# Patient Record
Sex: FEMALE | Race: BLACK OR AFRICAN AMERICAN | Employment: FULL TIME | RURAL
[De-identification: names, ages, dates, MRNs, and addresses within clinical notes are randomized per-mention and may not be internally consistent; named-entity substitution may affect disease eponyms.]

---

## 2017-03-06 ENCOUNTER — OFFICE VISIT (OUTPATIENT)
Dept: FAMILY MEDICINE CLINIC | Age: 47
End: 2017-03-06

## 2017-03-06 VITALS
RESPIRATION RATE: 17 BRPM | SYSTOLIC BLOOD PRESSURE: 110 MMHG | WEIGHT: 149.6 LBS | DIASTOLIC BLOOD PRESSURE: 62 MMHG | BODY MASS INDEX: 25.28 KG/M2

## 2017-03-06 DIAGNOSIS — E11.9 TYPE 2 DIABETES MELLITUS WITHOUT COMPLICATION, WITHOUT LONG-TERM CURRENT USE OF INSULIN (HCC): Primary | ICD-10-CM

## 2017-03-06 DIAGNOSIS — E78.2 MIXED HYPERLIPIDEMIA: ICD-10-CM

## 2017-03-06 RX ORDER — BLOOD SUGAR DIAGNOSTIC
STRIP MISCELLANEOUS
Refills: 3 | COMMUNITY
Start: 2017-02-05 | End: 2017-12-04 | Stop reason: SDUPTHER

## 2017-03-06 NOTE — LETTER
NOTIFICATION RETURN TO WORK / SCHOOL 
 
3/6/2017 9:29 AM 
 
Ms. [address] [address] [address] [facility] [phone] To Whom It May Concern: 
 
Neil Moore is currently under the care of Renetta Houston. She will return to work/school on: 03/07/2017. If there are questions or concerns please have the patient contact our office. Sincerely, Ramsey Corley NP

## 2017-03-06 NOTE — MR AVS SNAPSHOT
Visit Information Date & Time Provider Department Dept. Phone Encounter #  
 3/6/2017  8:00 AM Clarita Alex NP [address] [id number] Follow-up Instructions Return in about 6 months (around 9/6/2017). Follow-up and Disposition History Your Appointments 9/5/2017  8:00 AM  
ESTABLISHED PATIENT with Clarita Alex NP  
Renetta Houston ([address]) Appt Note: 6 mo f/u  
 1000 [facility] [address] [facility],5[address] [address] [phone]  
  
   
 [address] [facility],[address] [address] Upcoming Health Maintenance Date Due  
 FOOT EXAM Q1 1/14/1982 DTaP/Tdap/Td series (1 - Tdap) 1/14/1993 EYE EXAM RETINAL OR DILATED Q1 10/27/2015 INFLUENZA AGE 9 TO ADULT 8/1/2016 HEMOGLOBIN A1C Q6M 3/12/2017 BREAST CANCER SCRN MAMMOGRAM 5/8/2017 MICROALBUMIN Q1 9/12/2017 LIPID PANEL Q1 9/12/2017 PAP AKA CERVICAL CYTOLOGY 4/13/2018 Allergies as of 3/6/2017  Review Complete On: 3/6/2017 By: Clarita Alex NP No Known Allergies Current Immunizations  Never Reviewed Name Date Influenza Vaccine 10/13/2015, 2/2/2015 Pneumococcal Polysaccharide (PPSV-23) 2/2/2015 Not reviewed this visit You Were Diagnosed With   
  
 Codes Comments Type 2 diabetes mellitus without complication, without long-term current use of insulin (HCC)    -  Primary ICD-10-CM: E11.9 ICD-9-CM: 250.00 Mixed hyperlipidemia     ICD-10-CM: E78.2 ICD-9-CM: 272.2 Vitals BP Resp Weight(growth percentile) BMI Smoking Status 110/62 (BP 1 Location: Right arm, BP Patient Position: Sitting) 17 149 lb 9.6 oz (67.9 kg) 25.28 kg/m2 Never Smoker Vitals History BMI and BSA Data Body Mass Index Body Surface Area  
 25.28 kg/m 2 1.76 m 2 Preferred Pharmacy Pharmacy Name Phone [id number], [address] [place] [phone] Your Updated Medication List  
  
   
This list is accurate as of: 3/6/17 11:43 AM.  Always use your most recent med list.  
  
  
  
  
 iron aspgly,vv-Y-X14-FA-Ca-suc 596-49-24-4 mg-mg-mcg-mg Cap cap Commonly known as:  FERREX [address] Take 1 Cap by mouth daily. metFORMIN 500 mg tablet Commonly known as:  GLUCOPHAGE  
TAKE 1 TABLET BY MOUTH 2 TIMES DAILY WITH MEALS FOR SUGAR  
  
 MIRENA 20 mcg/24 hr (5 years) IUD Generic drug:  levonorgestrel 1 Each by IntraUTERine route once. ONETOUCH VERIO strip Generic drug:  glucose blood VI test strips USE TO CHECK FASTING BLOOD SUGAR EVERY MORNING  
  
 OTHER One touch verio flex glucose machine We Performed the Following HEMOGLOBIN A1C WITH EAG [40410 CPT(R)]  DIABETES FOOT EXAM [HM7 Custom] LIPID PANEL [16578 CPT(R)] METABOLIC PANEL, COMPREHENSIVE [53211 CPT(R)] MI COLLECTION VENOUS BLOOD,VENIPUNCTURE O0332469 CPT(R)] Follow-up Instructions Return in about 6 months (around 9/6/2017). Introducing [facility]! Reji Josemayra introduces [employer] patient portal. Now you can access parts of your medical record, email your doctor's office, and request medication refills online. 1. In your internet browser, go to [email or web address] 2. Click on the First Time User? Click Here link in the Sign In box. You will see the New Member Sign Up page. 3. Enter your [employer] Access Code exactly as it appears below. You will not need to use this code after youve completed the sign-up process. If you do not sign up before the expiration date, you must request a new code. · [employer] Access Code: [email or web address] Expires: 6/4/2017  8:09 AM 
 
4. Enter the last four digits of your Social Security Number (xxxx) and Date of Birth (mm/dd/yyyy) as indicated and click Submit. You will be taken to the next sign-up page. 5. Create a [employer] ID. This will be your [employer] login ID and cannot be changed, so think of one that is secure and easy to remember. 6. Create a [employer] password. You can change your password at any time. 7. Enter your Password Reset Question and Answer. This can be used at a later time if you forget your password. 8. Enter your e-mail address. You will receive e-mail notification when new information is available in [address] 9. Click Sign Up. You can now view and download portions of your medical record. 10. Click the Download Summary menu link to download a portable copy of your medical information. If you have questions, please visit the Frequently Asked Questions section of the [employer] website. Remember, [employer] is NOT to be used for urgent needs. For medical emergencies, dial 911. Now available from your iPhone and Android! Please provide this summary of care documentation to your next provider. Your primary care clinician is listed as Zac Grijalva. If you have any questions after today's visit, please call [phone].

## 2017-03-06 NOTE — PROGRESS NOTES
3/6/2017    Chief Complaint   Patient presents with    Diabetes     Check up and wants bloodwork        HPI: Mohan Hernandez is a 41 y.o. female. [employer] employee in [place]. Was being followed by Frederic Lees. Known to this provider from a couple of years ago. Presents for routine f/u Diabetes. Metformin bid. No adverse effects. Last A1C 6.9%    Lipids are very good. No therapy. No Known Allergies    Current Outpatient Prescriptions   Medication Sig Dispense Refill    ONETOUCH VERIO strip USE TO CHECK FASTING BLOOD SUGAR EVERY MORNING  3    metFORMIN (GLUCOPHAGE) 500 mg tablet TAKE 1 TABLET BY MOUTH 2 TIMES DAILY WITH MEALS FOR SUGAR 60 Tab 7    levonorgestrel (MIRENA) 20 mcg/24 hr (5 years) IUD 1 Each by IntraUTERine route once.  iron aspgly,ht-A-E32-FA-Ca-suc (FERREX 150 FORTE PLUS) 203-14-33-1 mg-mg-mcg-mg cap cap Take 1 Cap by mouth daily. 30 Cap 5    OTHER One touch verio flex glucose machine         Past Medical History:   Diagnosis Date    Anemia     Diabetes ([facility] [address]        Lab Results   Component Value Date/Time    Hemoglobin A1c 6.9 09/12/2016 08:16 AM    Hemoglobin A1c 6.6 02/22/2016 12:49 PM    Hemoglobin A1c 6.8 10/12/2015 09:19 AM    Glucose 148 09/12/2016 08:16 AM    Glucose  09/12/2016 01:51 PM    Microalb/Creat ratio (ug/mg creat.) 2.6 09/12/2016 08:15 AM    LDL, calculated 94 09/12/2016 08:16 AM    Creatinine 0.71 09/12/2016 08:16 AM       ROS:  Constitutional: No fever, chills or weight loss  Respiratory: No cough, SOB   CV: No chest pain or Palpitations  GI: No nausea, vomiting or diarrhea. : No dysuria or hematuria. Neuro: No headaches, seizures, change in mental status. Physical Exam:   VS Visit Vitals    /62 (BP 1 Location: Right arm, BP Patient Position: Sitting)    Resp 17    Wt 149 lb 9.6 oz (67.9 kg)    BMI 25.28 kg/m2      General  Alert, oriented, NAD.     ENMT Mucous membranes moist.    Oropharynx: no erythema, no exudates, no lesions, normal tongue. NECK Thyroid: normal size, nontender. Trachea midline, neck symmetrical and without masses. Carotids 2+ with no bruits. No enlarged nodes. RESP Clear to auscultation and percussion. No rales, wheezes, rhonchi, or rubs. CV RRR, with no S3 or S4, no murmur, no rub. Aorta: no bruit. MSKEL Normal gait and station. Normal strength and tone, no atrophy. EXT No deformity. Extremities without edema. Diabetic foot exam :   Measurement  Response    Monofilament   R - normal sensation with micro filament  L - normal sensation with micro filament    Pulse DP  R - 2+ (normal)  L - 2+ (normal)    Vibration  R - normal  L - normal    Structural deformity  R - None  L - None    Skin Integrity / Deformity  R - normal  L - normal       SKIN Skin warm, normal turgor. NEURO Cranial nerves normal 2-12. PSYCH Judgment and insight good. Oriented to person, place, and time. Affect is alert and attentive. 1. Type 2 diabetes mellitus without complication, without long-term current use of insulin ([address]  Well controlled. Check labs. - HEMOGLOBIN A1C WITH EAG  - LIPID PANEL  - METABOLIC PANEL, COMPREHENSIVE  - FL COLLECTION VENOUS BLOOD,VENIPUNCTURE  -  DIABETES FOOT EXAM    2. Mixed hyperlipidemia  Check labs. - LIPID PANEL  - METABOLIC PANEL, COMPREHENSIVE  - FL COLLECTION VENOUS BLOOD,VENIPUNCTURE        Orders Placed This Encounter    HEMOGLOBIN A1C WITH EAG    LIPID PANEL    METABOLIC PANEL, COMPREHENSIVE     DIABETES FOOT EXAM    FL COLLECTION VENOUS BLOOD,VENIPUNCTURE    ONETOUCH VERIO strip     Sig: USE TO CHECK FASTING BLOOD SUGAR EVERY MORNING     Refill:  3       Follow-up Disposition:  Return in about 6 months (around 9/6/2017).         KITTY Coughlin

## 2017-03-07 LAB
ALBUMIN SERPL-MCNC: 4.5 G/DL (ref 3.5–5.5)
ALBUMIN/GLOB SERPL: 1.6 {RATIO} (ref 1.1–2.5)
ALP SERPL-CCNC: 71 IU/L (ref 39–117)
ALT SERPL-CCNC: 12 IU/L (ref 0–32)
AST SERPL-CCNC: 17 IU/L (ref 0–40)
BILIRUB SERPL-MCNC: 0.7 MG/DL (ref 0–1.2)
BUN SERPL-MCNC: 7 MG/DL (ref 6–24)
BUN/CREAT SERPL: 9 (ref 9–23)
CALCIUM SERPL-MCNC: 8.8 MG/DL (ref 8.7–10.2)
CHLORIDE SERPL-SCNC: 100 MMOL/L (ref 96–106)
CHOLEST SERPL-MCNC: 158 MG/DL (ref 100–199)
CO2 SERPL-SCNC: 25 MMOL/L (ref 18–29)
CREAT SERPL-MCNC: 0.74 MG/DL (ref 0.57–1)
EST. AVERAGE GLUCOSE BLD GHB EST-MCNC: 146 MG/DL
GLOBULIN SER CALC-MCNC: 2.9 G/DL (ref 1.5–4.5)
GLUCOSE SERPL-MCNC: 118 MG/DL (ref 65–99)
HBA1C MFR BLD: 6.7 % (ref 4.8–5.6)
HDLC SERPL-MCNC: 58 MG/DL
LDLC SERPL CALC-MCNC: 94 MG/DL (ref 0–99)
POTASSIUM SERPL-SCNC: 3.7 MMOL/L (ref 3.5–5.2)
PROT SERPL-MCNC: 7.4 G/DL (ref 6–8.5)
SODIUM SERPL-SCNC: 139 MMOL/L (ref 134–144)
TRIGL SERPL-MCNC: 31 MG/DL (ref 0–149)
VLDLC SERPL CALC-MCNC: 6 MG/DL (ref 5–40)

## 2017-05-03 DIAGNOSIS — D50.0 IRON DEFICIENCY ANEMIA DUE TO CHRONIC BLOOD LOSS: ICD-10-CM

## 2017-09-05 ENCOUNTER — OFFICE VISIT (OUTPATIENT)
Dept: FAMILY MEDICINE CLINIC | Age: 47
End: 2017-09-05

## 2017-09-05 VITALS
RESPIRATION RATE: 17 BRPM | HEIGHT: 65 IN | SYSTOLIC BLOOD PRESSURE: 135 MMHG | WEIGHT: 159.4 LBS | OXYGEN SATURATION: 100 % | TEMPERATURE: 96.9 F | BODY MASS INDEX: 26.56 KG/M2 | DIASTOLIC BLOOD PRESSURE: 85 MMHG | HEART RATE: 84 BPM

## 2017-09-05 DIAGNOSIS — E78.5 HYPERLIPIDEMIA, UNSPECIFIED HYPERLIPIDEMIA TYPE: ICD-10-CM

## 2017-09-05 DIAGNOSIS — H61.23 IMPACTED CERUMEN, BILATERAL: ICD-10-CM

## 2017-09-05 DIAGNOSIS — E11.9 TYPE 2 DIABETES MELLITUS WITHOUT COMPLICATION, WITHOUT LONG-TERM CURRENT USE OF INSULIN (HCC): Primary | ICD-10-CM

## 2017-09-05 DIAGNOSIS — Z23 ENCOUNTER FOR IMMUNIZATION: ICD-10-CM

## 2017-09-05 NOTE — LETTER
NOTIFICATION RETURN TO WORK / SCHOOL 
 
9/5/2017 9:02 AM 
 
Ms. [address] [address] [address] [facility] [address] [phone] To Whom It May Concern: 
 
Osito Mills is currently under the care of Renetta Houston. She will return to work/school on: 09/07/2017 If there are questions or concerns please have the patient contact our office. Sincerely, Nacho Resendez NP

## 2017-09-05 NOTE — PROGRESS NOTES
Chief Complaint   Patient presents with    Diabetes     6 month check up          HPI:       is a 41 y.o. female. [employer] employee in [place]. Presents for routine f/u     Diabetes: Metformin 500 mg bid. Misses doses occasionally at night. No adverse effects. Her BG has been running 120 when fasting and 170-180s after meals. Last A1C 6.7%     Lipids are very good. No therapy. Iron deficiency: Doing well on iron. Mammogram: 5/8/15. Normal.  Plan for mammogram in October. Eye exam: To be done today. Last pap 4/13/15 was normal.      New Issues:  She feels as though her ears are impacted. Tried to use a Q-tip to clean them. Plan to administer Flu shot today. No Known Allergies    Current Outpatient Prescriptions   Medication Sig    iron aspgly,kn-X-H48-FA-Ca-suc (FERREX 150 FORTE PLUS) 985-90-87-3 mg-mg-mcg-mg cap cap Take 1 Cap by mouth daily.  ONETOUCH VERIO strip USE TO CHECK FASTING BLOOD SUGAR EVERY MORNING    metFORMIN (GLUCOPHAGE) 500 mg tablet TAKE 1 TABLET BY MOUTH 2 TIMES DAILY WITH MEALS FOR SUGAR    levonorgestrel (MIRENA) 20 mcg/24 hr (5 years) IUD 1 Each by IntraUTERine route once.  OTHER One touch verio flex glucose machine     No current facility-administered medications for this visit. Past Medical History:   Diagnosis Date    Anemia     Diabetes ([address]        No past surgical history on file. Social History     Social History    Marital status: SINGLE     Spouse name: N/A    Number of children: N/A    Years of education: N/A     Social History Main Topics    Smoking status: Never Smoker    Smokeless tobacco: None    Alcohol use Yes    Drug use: No    Sexual activity: Not Asked     Other Topics Concern    None     Social History Narrative       Family History   Problem Relation Age of Onset    Diabetes Mother     Diabetes Father        Above history reviewed.       ROS:  Denies fever, chills, cough, chest pain, SOB,  nausea, vomiting, or diarrhea. Denies wt loss, wt gain, hemoptysis, hematochezia or melena. Physical Examination:    /85 (BP 1 Location: Right arm, BP Patient Position: Sitting)  Pulse 84  Temp 96.9 °F (36.1 °C) (Oral)   Resp 17  Ht 5' 4.5\" (1.638 m)  Wt 159 lb 6.4 oz (72.3 kg)  SpO2 100%  BMI 26.94 kg/m2    General: Alert and Ox3, Fluent speech  HEENT:  PERRLA, EOM intact, impacted cerumen bilaterally prior to irrigation, after irrigation: TMs normal, turbinates, pharynx normal.  No thyromegaly. No cervical adenopathy. Neck:  Supple, no adenopathy, JVD, mass or bruit  Chest:  Clear to Ausculation, without wheezes, rales, rubs or ronchi  Cardiac: RRR  Extremities:  No cyanosis, clubbing or edema  Neurologic:  Ambulatory without assist, CN 2-12 grossly intact. Moves all extremities. Skin: no rash  Lymphadenopathy: no cervical or supraclavicular nodes    Ceruminosis is noted. Wax is removed by syringing and manual debridement. Instructions for home care to prevent wax buildup are given. ASSESSMENT AND PLAN:     1. Type 2 diabetes mellitus without complication, without long-term current use of insulin (HCC)  Good control  Continue current regimen   - MICROALBUMIN, UR, RAND W/ MICROALBUMIN/CREA RATIO  - HEMOGLOBIN A1C WITH EAG    2. Hyperlipidemia, unspecified hyperlipidemia type  Good control  Continue current regimen   - LIPID PANEL  - CBC WITH AUTOMATED DIFF  - METABOLIC PANEL, COMPREHENSIVE    3. Impacted cerumen, bilateral  Ceruminosis is noted. Wax is removed by syringing and manual debridement. Instructions for home care to prevent wax buildup are given. - REMOVAL IMPACTED CERUMEN IRRIGATION/LVG UNILAT    4.  Encounter for immunization  Administering today  - [place] IMMUNIZ ADMIN,1 SINGLE/COMB VAC/TOXOID  - INFLUENZA VIRUS VACCINE QUADRIVALENT, PRESERVATIVE FREE SYRINGE (45393)     RTC in 6 months    Vera Carson NP

## 2017-09-05 NOTE — PATIENT INSTRUCTIONS

## 2017-09-05 NOTE — MR AVS SNAPSHOT
Visit Information Date & Time Provider Department Dept. Phone Encounter #  
 9/5/2017  8:00 AM Tracey Mustafa NP [facility] [address] [phone] [id number] Follow-up Instructions Return in about 6 months (around 3/5/2018). Upcoming Health Maintenance Date Due  
 EYE EXAM RETINAL OR DILATED Q1 10/27/2015 BREAST CANCER SCRN MAMMOGRAM 5/8/2017 INFLUENZA AGE 9 TO ADULT 8/1/2017 HEMOGLOBIN A1C Q6M 9/6/2017 MICROALBUMIN Q1 9/12/2017 FOOT EXAM Q1 3/6/2018 LIPID PANEL Q1 3/6/2018 PAP AKA CERVICAL CYTOLOGY 4/13/2018 DTaP/Tdap/Td series (2 - Td) 9/5/2027 Allergies as of 9/5/2017  Review Complete On: 9/5/2017 By: Tracey Mustafa NP No Known Allergies Current Immunizations  Reviewed on 9/5/2017 Name Date Influenza Vaccine 10/13/2015, 2/2/2015 Influenza Vaccine (Quad) PF 9/5/2017  8:51 AM  
 Pneumococcal Polysaccharide (PPSV-23) 2/2/2015 Reviewed by Eric Merino on 9/5/2017 at  8:22 AM  
You Were Diagnosed With   
  
 Codes Comments Type 2 diabetes mellitus without complication, without long-term current use of insulin (HCC)    -  Primary ICD-10-CM: E11.9 ICD-9-CM: 250.00 Hyperlipidemia, unspecified hyperlipidemia type     ICD-10-CM: E78.5 ICD-9-CM: 272.4 Impacted cerumen, bilateral     ICD-10-CM: H61.23 
ICD-9-CM: 380.4 Encounter for immunization     ICD-10-CM: W00 ICD-9-CM: V03.89 Vitals BP Pulse Temp Resp Height(growth percentile) Weight(growth percentile) 135/85 (BP 1 Location: Right arm, BP Patient Position: Sitting) 84 96.9 °F (36.1 °C) (Oral) 17 5' 4.5\" (1.638 m) 159 lb 6.4 oz (72.3 kg) SpO2 BMI Smoking Status 100% 26.94 kg/m2 Never Smoker BMI and BSA Data Body Mass Index Body Surface Area  
 26.94 kg/m 2 1.81 m 2 Preferred Pharmacy Pharmacy Name Phone [id number] [place] [address], [address] [phone] Your Updated Medication List  
  
   
This list is accurate as of: 9/5/17  8:58 AM.  Always use your most recent med list.  
  
  
  
  
 iron aspgly,[id number]-Ca-suc 296-11-99-6 mg-mg-mcg-mg Cap cap Commonly known as:  FERREX [address] Take 1 Cap by mouth daily. metFORMIN 500 mg tablet Commonly known as:  GLUCOPHAGE  
TAKE 1 TABLET BY MOUTH 2 TIMES DAILY WITH MEALS FOR SUGAR  
  
 MIRENA 20 mcg/24 hr (5 years) IUD Generic drug:  levonorgestrel 1 Each by IntraUTERine route once. ONETOUCH VERIO strip Generic drug:  glucose blood VI test strips USE TO CHECK FASTING BLOOD SUGAR EVERY MORNING  
  
 OTHER One touch verio flex glucose machine We Performed the Following CBC WITH AUTOMATED DIFF [52008 CPT(R)] HEMOGLOBIN A1C WITH EAG [03302 CPT(R)] INFLUENZA VIRUS VAC QUAD,SPLIT,PRESV FREE SYRINGE 3/> YRS IM [id number] CPT(R)] LIPID PANEL [32188 CPT(R)] METABOLIC PANEL, COMPREHENSIVE [37818 CPT(R)] MICROALBUMIN, UR, RAND W/ MICROALBUMIN/CREA RATIO [id number] CPT(R)] SC IMMUNIZ ADMIN,1 SINGLE/COMB VAC/TOXOID [id number] CPT(R)] REMOVAL IMPACTED CERUMEN IRRIGATION/LVG UNILAT [id number] CPT(R)] Follow-up Instructions Return in about 6 months (around 3/5/2018). To-Do List   
 10/09/2017 11:00 AM  
  Appointment with [address] [place] [address] at [facility] Mammography ([phone]) IMPORTANT - Bring all relevant prior images from any facility outside of [facility] with you on the day of your exam.  Radiologist cannot provide same day results without comparison images. EXAM INSTRUCTIONS -Do not wear deodorant, lotion, or powders to your appointment. GENERAL INSTRUCTIONS - Bring a list of all medications you are currently taking, including over the counter medications. - Only patients will be allowed in the exam room.   This includes children. - Children under the age of 14 may not be left unattended. - [facility] patients must have an armband and be accompanied by a caregiver or family member. - If you have a hand-written prescription for this exam, you must bring it with you on the day of your exam.  If you have questions or need to reschedule or cancel your appointment, call [phone]. Patient Instructions Learning About Diabetes Food Guidelines Your Care Instructions Meal planning is important to manage diabetes. It helps keep your blood sugar at a target level (which you set with your doctor). You don't have to eat special foods. You can eat what your family eats, including sweets once in a while. But you do have to pay attention to how often you eat and how much you eat of certain foods. You may want to work with a dietitian or a certified diabetes educator (CDE) to help you plan meals and snacks. A dietitian or CDE can also help you lose weight if that is one of your goals. What should you know about eating carbs? Managing the amount of carbohydrate (carbs) you eat is an important part of healthy meals when you have diabetes. Carbohydrate is found in many foods. · Learn which foods have carbs. And learn the amounts of carbs in different foods. ¨ Bread, cereal, pasta, and rice have about 15 grams of carbs in a serving. A serving is 1 slice of bread (1 ounce), ½ cup of cooked cereal, or 1/3 cup of cooked pasta or rice. ¨ Fruits have 15 grams of carbs in a serving. A serving is 1 small fresh fruit, such as an apple or orange; ½ of a banana; ½ cup of cooked or canned fruit; ½ cup of fruit juice; 1 cup of melon or raspberries; or 2 tablespoons of dried fruit. ¨ Milk and no-sugar-added yogurt have 15 grams of carbs in a serving. A serving is 1 cup of milk or 2/3 cup of no-sugar-added yogurt. ¨ Starchy vegetables have 15 grams of carbs in a serving.  A serving is ½ cup of mashed potatoes or sweet potato; 1 cup winter squash; ½ of a small baked potato; ½ cup of cooked beans; or ½ cup cooked corn or green peas. · Learn how much carbs to eat each day and at each meal. A dietitian or CDE can teach you how to keep track of the amount of carbs you eat. This is called carbohydrate counting. · If you are not sure how to count carbohydrate grams, use the Plate Method to plan meals. It is a good, quick way to make sure that you have a balanced meal. It also helps you spread carbs throughout the day. ¨ Divide your plate by types of foods. Put non-starchy vegetables on half the plate, meat or other protein food on one-quarter of the plate, and a grain or starchy vegetable in the final quarter of the plate. To this you can add a small piece of fruit and 1 cup of milk or yogurt, depending on how many carbs you are supposed to eat at a meal. 
· Try to eat about the same amount of carbs at each meal. Do not \"save up\" your daily allowance of carbs to eat at one meal. 
· Proteins have very little or no carbs per serving. Examples of proteins are beef, chicken, turkey, fish, eggs, tofu, cheese, cottage cheese, and peanut butter. A serving size of meat is 3 ounces, which is about the size of a deck of cards. Examples of meat substitute serving sizes (equal to 1 ounce of meat) are 1/4 cup of cottage cheese, 1 egg, 1 tablespoon of peanut butter, and ½ cup of tofu. How can you eat out and still eat healthy? · Learn to estimate the serving sizes of foods that have carbohydrate. If you measure food at home, it will be easier to estimate the amount in a serving of restaurant food. · If the meal you order has too much carbohydrate (such as potatoes, corn, or baked beans), ask to have a low-carbohydrate food instead. Ask for a salad or green vegetables. · If you use insulin, check your blood sugar before and after eating out to help you plan how much to eat in the future.  
· If you eat more carbohydrate at a meal than you had planned, take a walk or do other exercise. This will help lower your blood sugar. What else should you know? · Limit saturated fat, such as the fat from meat and dairy products. This is a healthy choice because people who have diabetes are at higher risk of heart disease. So choose lean cuts of meat and nonfat or low-fat dairy products. Use olive or canola oil instead of butter or shortening when cooking. · Don't skip meals. Your blood sugar may drop too low if you skip meals and take insulin or certain medicines for diabetes. · Check with your doctor before you drink alcohol. Alcohol can cause your blood sugar to drop too low. Alcohol can also cause a bad reaction if you take certain diabetes medicines. Follow-up care is a key part of your treatment and safety. Be sure to make and go to all appointments, and call your doctor if you are having problems. It's also a good idea to know your test results and keep a list of the medicines you take. Where can you learn more? Go to http://pino-kenya.info/. Enter Q997 in the search box to learn more about \"Learning About Diabetes Food Guidelines. \" Current as of: March 13, 2017 Content Version: 11.3 © 1969-0357 [employer]. Care instructions adapted under license by [employer] (which disclaims liability or warranty for this information). If you have questions about a medical condition or this instruction, always ask your healthcare professional. [address] any warranty or liability for your use of this information. Introducing [facility]! [facility] introduces HQ plus patient portal. Now you can access parts of your medical record, email your doctor's office, and request medication refills online. 1. In your internet browser, go to [email or web address] 2. Click on the First Time User? Click Here link in the Sign In box. You will see the New Member Sign Up page. 3. Enter your tagga Access Code exactly as it appears below. You will not need to use this code after youve completed the sign-up process. If you do not sign up before the expiration date, you must request a new code. · tagga Access Code: [email or web address] Expires: 11/20/2017  3:55 PM 
 
4. Enter the last four digits of your Social Security Number (xxxx) and Date of Birth (mm/dd/yyyy) as indicated and click Submit. You will be taken to the next sign-up page. 5. Create a tagga ID. This will be your tagga login ID and cannot be changed, so think of one that is secure and easy to remember. 6. Create a tagga password. You can change your password at any time. 7. Enter your Password Reset Question and Answer. This can be used at a later time if you forget your password. 8. Enter your e-mail address. You will receive e-mail notification when new information is available in [address]. 9. Click Sign Up. You can now view and download portions of your medical record. 10. Click the Download Summary menu link to download a portable copy of your medical information. If you have questions, please visit the Frequently Asked Questions section of the tagga website. Remember, tagga is NOT to be used for urgent needs. For medical emergencies, dial 911. Now available from your iPhone and Android! Please provide this summary of care documentation to your next provider. Your primary care clinician is listed as Rico Gonzales. If you have any questions after today's visit, please call [phone].

## 2017-09-06 LAB
ALBUMIN SERPL-MCNC: 4 G/DL (ref 3.5–5.5)
ALBUMIN/CREAT UR: 6.3 MG/G CREAT (ref 0–30)
ALBUMIN/GLOB SERPL: 1.5 {RATIO} (ref 1.2–2.2)
ALP SERPL-CCNC: 66 IU/L (ref 39–117)
ALT SERPL-CCNC: 14 IU/L (ref 0–32)
AST SERPL-CCNC: 21 IU/L (ref 0–40)
BASOPHILS # BLD AUTO: 0 X10E3/UL (ref 0–0.2)
BASOPHILS NFR BLD AUTO: 0 %
BILIRUB SERPL-MCNC: 0.5 MG/DL (ref 0–1.2)
BUN SERPL-MCNC: 9 MG/DL (ref 6–24)
BUN/CREAT SERPL: 13 (ref 9–23)
CALCIUM SERPL-MCNC: 8.8 MG/DL (ref 8.7–10.2)
CHLORIDE SERPL-SCNC: 100 MMOL/L (ref 96–106)
CHOLEST SERPL-MCNC: 153 MG/DL (ref 100–199)
CO2 SERPL-SCNC: 24 MMOL/L (ref 18–29)
CREAT SERPL-MCNC: 0.7 MG/DL (ref 0.57–1)
CREAT UR-MCNC: 291.1 MG/DL
EOSINOPHIL # BLD AUTO: 0.1 X10E3/UL (ref 0–0.4)
EOSINOPHIL NFR BLD AUTO: 2 %
ERYTHROCYTE [DISTWIDTH] IN BLOOD BY AUTOMATED COUNT: 13.4 % (ref 12.3–15.4)
EST. AVERAGE GLUCOSE BLD GHB EST-MCNC: 151 MG/DL
GLOBULIN SER CALC-MCNC: 2.7 G/DL (ref 1.5–4.5)
GLUCOSE SERPL-MCNC: 155 MG/DL (ref 65–99)
HBA1C MFR BLD: 6.9 % (ref 4.8–5.6)
HCT VFR BLD AUTO: 35.6 % (ref 34–46.6)
HDLC SERPL-MCNC: 72 MG/DL
HGB BLD-MCNC: 11.8 G/DL (ref 11.1–15.9)
IMM GRANULOCYTES # BLD: 0 X10E3/UL (ref 0–0.1)
IMM GRANULOCYTES NFR BLD: 0 %
LDLC SERPL CALC-MCNC: 73 MG/DL (ref 0–99)
LYMPHOCYTES # BLD AUTO: 1.3 X10E3/UL (ref 0.7–3.1)
LYMPHOCYTES NFR BLD AUTO: 33 %
MCH RBC QN AUTO: 27.9 PG (ref 26.6–33)
MCHC RBC AUTO-ENTMCNC: 33.1 G/DL (ref 31.5–35.7)
MCV RBC AUTO: 84 FL (ref 79–97)
MICROALBUMIN UR-MCNC: 18.4 UG/ML
MONOCYTES # BLD AUTO: 0.3 X10E3/UL (ref 0.1–0.9)
MONOCYTES NFR BLD AUTO: 7 %
NEUTROPHILS # BLD AUTO: 2.3 X10E3/UL (ref 1.4–7)
NEUTROPHILS NFR BLD AUTO: 58 %
PLATELET # BLD AUTO: 303 X10E3/UL (ref 150–379)
POTASSIUM SERPL-SCNC: 3.6 MMOL/L (ref 3.5–5.2)
PROT SERPL-MCNC: 6.7 G/DL (ref 6–8.5)
RBC # BLD AUTO: 4.23 X10E6/UL (ref 3.77–5.28)
SODIUM SERPL-SCNC: 140 MMOL/L (ref 134–144)
TRIGL SERPL-MCNC: 42 MG/DL (ref 0–149)
VLDLC SERPL CALC-MCNC: 8 MG/DL (ref 5–40)
WBC # BLD AUTO: 3.9 X10E3/UL (ref 3.4–10.8)

## 2017-09-07 NOTE — PROGRESS NOTES
Urine test is OK. Hemoglobin A1c is creeping up. Work on not missing your evening dose of Metformin. Cholesterol looks good. Blood count is normal.  Kidneys and liver look OK.

## 2017-09-18 RX ORDER — METFORMIN HYDROCHLORIDE 500 MG/1
TABLET ORAL
Qty: 60 TAB | Refills: 7 | Status: SHIPPED | OUTPATIENT
Start: 2017-09-18 | End: 2017-09-20 | Stop reason: SDUPTHER

## 2017-09-21 RX ORDER — METFORMIN HYDROCHLORIDE 500 MG/1
TABLET ORAL
Qty: 60 TAB | Refills: 7 | Status: SHIPPED | OUTPATIENT
Start: 2017-09-21 | End: 2018-03-02 | Stop reason: SDUPTHER

## 2017-12-04 ENCOUNTER — OFFICE VISIT (OUTPATIENT)
Dept: FAMILY MEDICINE CLINIC | Age: 47
End: 2017-12-04

## 2017-12-04 VITALS
HEART RATE: 91 BPM | HEIGHT: 65 IN | TEMPERATURE: 98.6 F | DIASTOLIC BLOOD PRESSURE: 70 MMHG | SYSTOLIC BLOOD PRESSURE: 110 MMHG | OXYGEN SATURATION: 97 % | WEIGHT: 157.8 LBS | BODY MASS INDEX: 26.29 KG/M2 | RESPIRATION RATE: 14 BRPM

## 2017-12-04 DIAGNOSIS — B37.31 VAGINA, CANDIDIASIS: Primary | ICD-10-CM

## 2017-12-04 DIAGNOSIS — E11.9 TYPE 2 DIABETES MELLITUS WITHOUT COMPLICATION, WITHOUT LONG-TERM CURRENT USE OF INSULIN (HCC): ICD-10-CM

## 2017-12-04 DIAGNOSIS — J06.9 UPPER RESPIRATORY TRACT INFECTION, UNSPECIFIED TYPE: ICD-10-CM

## 2017-12-04 DIAGNOSIS — N89.8 VAGINAL DISCHARGE: ICD-10-CM

## 2017-12-04 DIAGNOSIS — J04.0 LARYNGITIS: ICD-10-CM

## 2017-12-04 RX ORDER — METHYLPREDNISOLONE ACETATE 80 MG/ML
80 INJECTION, SUSPENSION INTRA-ARTICULAR; INTRALESIONAL; INTRAMUSCULAR; SOFT TISSUE ONCE
Qty: 1 VIAL | Refills: 0
Start: 2017-12-04 | End: 2017-12-04

## 2017-12-04 RX ORDER — BLOOD SUGAR DIAGNOSTIC
STRIP MISCELLANEOUS SEE ADMIN INSTRUCTIONS
Qty: 100 STRIP | Refills: 3 | Status: SHIPPED | OUTPATIENT
Start: 2017-12-04 | End: 2018-09-04 | Stop reason: SDUPTHER

## 2017-12-04 RX ORDER — FLUCONAZOLE 150 MG/1
150 TABLET ORAL DAILY
Qty: 1 TAB | Refills: 0 | Status: SHIPPED | OUTPATIENT
Start: 2017-12-04 | End: 2017-12-05

## 2017-12-04 RX ORDER — LANCETS 33 GAUGE
EACH MISCELLANEOUS
Qty: 100 LANCET | Refills: 3 | Status: SHIPPED | OUTPATIENT
Start: 2017-12-04 | End: 2018-09-04 | Stop reason: SDUPTHER

## 2017-12-04 NOTE — PROGRESS NOTES
Chief Complaint   Patient presents with    Hoarse         HPI:       is a 41 y.o. female. [employer] employee in [place]. Presents for routine f/u      Diabetes: Metformin 500 mg bid. Misses doses occasionally at night. No adverse effects. Her BG has been running 120 when fasting and 170-180s after meals. Last A1C 6.9%     Lipids are very good. No therapy. Iron deficiency: Doing well on iron. Mammogram: 10/2017. Normal.    Eye exam: 09/2017   Last pap 2017 was normal.      New Issues:  Has been sick for about a week. Taking Nyquil and Yana-[place] Plus Cold. Her voice has been hoarse. She is coughing up discolored phlegm. No fevers or body aches. She is also having some white vaginal discharge. Normal pap a few months ago. No new sexual partners. No odor or discomfort. Denies chance of pregnancy. No Known Allergies    Current Outpatient Prescriptions   Medication Sig    DM/P-EPHED/ACETAMINOPH/DOXYLAM (NYQUIL PO) Take  by mouth.  ASPIRIN/SOD BICARB/CITRIC ACID (YANA-SELTZER EXTRA STRENGTH PO) Take  by mouth.  ONETOUCH VERIO strip USE TO CHECK FASTING BLOOD SUGAR EVERY MORNING    levonorgestrel (MIRENA) 20 mcg/24 hr (5 years) IUD 1 Each by IntraUTERine route once.  OTHER One touch verio flex glucose machine    metFORMIN (GLUCOPHAGE) 500 mg tablet TAKE 1 TABLET BY MOUTH 2 TIMES DAILY WITH MEALS FOR SUGAR    iron aspgly,jw-M-I23-FA-Ca-suc (FERREX 150 FORTE PLUS) 986-28-77-7 mg-mg-mcg-mg cap cap Take 1 Cap by mouth daily. No current facility-administered medications for this visit. Past Medical History:   Diagnosis Date    Anemia     Diabetes ([address]        No past surgical history on file. Social History     Social History    Marital status: SINGLE     Spouse name: N/A    Number of children: N/A    Years of education: N/A     Social History Main Topics    Smoking status: Never Smoker    Smokeless tobacco: Not on file    Alcohol use Yes    Drug use:  No  Sexual activity: Not on file     Other Topics Concern    Not on file     Social History Narrative       Family History   Problem Relation Age of Onset    Diabetes Mother     Diabetes Father        Above history reviewed. ROS:  Denies fever, chills, cough, chest pain, SOB,  nausea, vomiting, or diarrhea. Denies wt loss, wt gain, hemoptysis, hematochezia or melena. Physical Examination:    /70 (BP 1 Location: Left arm, BP Patient Position: Sitting)  Pulse 91  Temp 98.6 °F (37 °C) (Oral)   Resp 14  Ht 5' 4.5\" (1.638 m)  Wt 157 lb 12.8 oz (71.6 kg)  LMP Comment: on mirena  SpO2 97%  BMI 26.67 kg/m2    General: Alert and Ox3, Fluent speech  HEENT:  PERRLA, EOM intact, TMs, turbinates, pharynx normal.  No thyromegaly. No                   cervical adenopathy. Neck:  Supple, no adenopathy, JVD, mass or bruit  Chest:  Clear to Ausculation, without wheezes, rales, rubs or ronchi  Cardiac: RRR  Abdomen:  +BS, soft, nontender without palpable HSM  Gynecologic: The external genitalia is without lesions. Introitus is normal, vaginal walls pink and moist without lesions or evidence of trauma. There is no cervical motion tenderness and the adnexa are without masses. White, thin discharge is noted. Extremities:  No cyanosis, clubbing or edema  Neurologic:  Ambulatory without assist, CN 2-12 grossly intact. Moves all extremities. Skin: no rash  Lymphadenopathy: no cervical or supraclavicular nodes    ASSESSMENT AND PLAN:     1. Vagina, candidiasis  Findings consistent with candida infection  - fluconazole (DIFLUCAN) 150 mg tablet; Take 1 Tab by mouth daily for 1 day. FDA advises cautious prescribing of oral fluconazole in pregnancy. Dispense: 1 Tab; Refill: 0    2. Vaginal discharge  Ruling out other causes  - NUSWAB VAGINITIS PLUS    3.  Type 2 diabetes mellitus without complication, without long-term current use of insulin (HCC)  Good control  Continue current regimen   - HEMOGLOBIN A1C WITH EAG  - METABOLIC PANEL, COMPREHENSIVE    4. Upper respiratory tract infection, unspecified type  Symptoms are viral. Discussed recommendation to avoid antibiotic. Reviewed self care measures:  Fluids  Nasal Saline  Humidification + menthol petroleum (Vicks.)  Postural drainage  NSAID of choice PRN  Avoid decongestants, too drying and difficult to clear respiratory secretions.   - METHYLPREDNISOLONE ACETATE INJECTION 80 MG  - IN THER/PROPH/DIAG INJECTION, SUBCUT/IM  - methylPREDNISolone acetate (DEPO-MEDROL) 80 mg/mL injection; 1 mL by IntraMUSCular route once for 1 dose. Dispense: 1 Vial; Refill: 0    5. Laryngitis  Hot tea and salt water gargles  - METHYLPREDNISOLONE ACETATE INJECTION 80 MG  - IN THER/PROPH/DIAG INJECTION, SUBCUT/IM  - methylPREDNISolone acetate (DEPO-MEDROL) 80 mg/mL injection; 1 mL by IntraMUSCular route once for 1 dose.   Dispense: 1 Vial; Refill: 0     RTC in 3 months    Qi Street, NP

## 2017-12-04 NOTE — PATIENT INSTRUCTIONS
Laryngitis: Care Instructions  Your Care Instructions    Laryngitis is an inflammation of the voice box (larynx) that causes your voice to become raspy or hoarse. It can be short-lived or long-lasting. Most of the time, laryngitis comes on quickly and lasts as long as 2 weeks. It is caused by overuse, irritation, or infection of the vocal cords inside the larynx. Some of the most common causes are a cold, the flu, or allergies. Loud talking, shouting, cheering, or singing also can cause laryngitis. Stomach acid that backs up into the throat also can make you lose your voice. Resting your voice and taking other steps at home can help you get your voice back. Follow-up care is a key part of your treatment and safety. Be sure to make and go to all appointments, and call your doctor if you are having problems. It's also a good idea to know your test results and keep a list of the medicines you take. How can you care for yourself at home? · Follow your doctor's directions for treating the condition that caused you to lose your voice. If your doctor prescribed antibiotics, take them as directed. Do not stop taking them just because you feel better. You need to take the full course of antibiotics. · Before you use cough and cold medicines, check the label. They may not be safe for young children or for people with certain health problems. · Try to keep stomach acid from backing up into your throat. Do not eat just before bedtime. Reduce the amount of coffee and alcohol you drink, and eat healthy foods. Taking over-the-counter acid reducers can help when these steps are not enough. In some cases, you may need prescription medicine. · Rest your voice. You do not have to stop speaking, but use your voice as little as possible. Speak softly but do not whisper; whispering can bother your larynx more than speaking softly. Avoid talking on the telephone or trying to speak loudly. · Try not to clear your throat.  This can cause more irritation of your larynx. Take an over-the-counter cough suppressant (if your doctor recommends it) if you have a dry cough that does not produce mucus. · Do not smoke or allow others to smoke around you. If you need help quitting, talk to your doctor about stop-smoking programs and medicines. These can increase your chances of quitting for good. · Use a humidifier or vaporizer to add moisture to your bedroom. Humidity helps to thin the mucus in the nasal membranes that causes stuffiness or postnasal drip. Follow the directions for cleaning the machine. · Drink plenty of fluids, enough so that your urine is light yellow or clear like water. If you have kidney, heart, or liver disease and have to limit fluids, talk with your doctor before you increase the amount of fluids you drink. · Use saline (saltwater) nasal washes to help keep your nasal passages open and wash out mucus and bacteria. You can buy saline nose drops at a grocery store or drugstore. Or, you can make your own at home by mixing ½ teaspoon salt, 1 cup water (at room temperature), and ½ teaspoon baking soda. If you make your own, fill a bulb syringe with the solution, insert the tip into your nostril, and squeeze gently. [place] your nose. When should you call for help? Call 911 anytime you think you may need emergency care. For example, call if:  ? · You have trouble breathing. ?Call your doctor now or seek immediate medical care if:  ? · You have new or worse pain. ? · You have trouble swallowing. ? Watch closely for changes in your health, and be sure to contact your doctor if:  ? · You do not get better as expected. Where can you learn more? Go to http://pino-kenya.info/. Enter D053 in the search box to learn more about \"Laryngitis: Care Instructions. \"  Current as of: May 12, 2017  Content Version: 11.4  © 9388-2038 Healthwise, [employer].  Care instructions adapted under license by [facility] (which disclaims liability or warranty for this information). If you have questions about a medical condition or this instruction, always ask your healthcare professional. [address] any warranty or liability for your use of this information. Vaginal Yeast Infection: Care Instructions  Your Care Instructions    A vaginal yeast infection is caused by too many yeast cells in the vagina. This is common in women of all ages. Itching, vaginal discharge and irritation, and other symptoms can bother you. But yeast infections don't often cause other health problems. Some medicines can increase your risk of getting a yeast infection. These include antibiotics, birth control pills, hormones, and steroids. You may also be more likely to get a yeast infection if you are pregnant, have diabetes, douche, or wear tight clothes. With treatment, most yeast infections get better in 2 to 3 days. Follow-up care is a key part of your treatment and safety. Be sure to make and go to all appointments, and call your doctor if you are having problems. It's also a good idea to know your test results and keep a list of the medicines you take. How can you care for yourself at home? · Take your medicines exactly as prescribed. Call your doctor if you think you are having a problem with your medicine. · Ask your doctor about over-the-counter (OTC) medicines for yeast infections. They may cost less than prescription medicines. If you use an OTC treatment, read and follow all instructions on the label. · Do not use tampons while using a vaginal cream or suppository. The tampons can absorb the medicine. Use pads instead. · Wear loose cotton clothing. Do not wear nylon or other fabric that holds body heat and moisture close to the skin. · Try sleeping without underwear. · Do not scratch. Relieve itching with a cold pack or a cool bath. · Do not wash your vaginal area more than once a day.  Use plain water or a mild, unscented soap. Air-dry the vaginal area. · Change out of wet swimsuits after swimming. · Do not have sex until you have finished your treatment. · Do not douche. When should you call for help? Call your doctor now or seek immediate medical care if:  ? · You have unexpected vaginal bleeding. ? · You have new or increased pain in your vagina or pelvis. ? Watch closely for changes in your health, and be sure to contact your doctor if:  ? · You have a fever. ? · You are not getting better after 2 days. ? · Your symptoms come back after you finish your medicines. Where can you learn more? Go to http://pino-kenya.info/. Enter F604 in the search box to learn more about \"Vaginal Yeast Infection: Care Instructions. \"  Current as of: October 13, 2016  Content Version: 11.4  © 7240-3914 [employer]. Care instructions adapted under license by [employer] (which disclaims liability or warranty for this information). If you have questions about a medical condition or this instruction, always ask your healthcare professional. [address] any warranty or liability for your use of this information. If you have any questions regarding [facility], you may call [facility] support at [phone].

## 2017-12-04 NOTE — MR AVS SNAPSHOT
Visit Information Date & Time Provider Department Dept. Phone Encounter #  
 12/4/2017  8:00 AM Sharon Frazier NP [facility] [address] [phone] [id number] Follow-up Instructions Return in about 3 months (around 3/4/2018). Upcoming Health Maintenance Date Due HEMOGLOBIN A1C Q6M 3/5/2018 FOOT EXAM Q1 3/6/2018 PAP AKA CERVICAL CYTOLOGY 4/13/2018 MICROALBUMIN Q1 9/5/2018 EYE EXAM RETINAL OR DILATED Q1 9/5/2018 LIPID PANEL Q1 9/5/2018 BREAST CANCER SCRN MAMMOGRAM 11/10/2019 DTaP/Tdap/Td series (2 - Td) 9/5/2027 Allergies as of 12/4/2017  Review Complete On: 12/4/2017 By: Manasa Barros RN No Known Allergies Current Immunizations  Reviewed on 9/5/2017 Name Date Influenza Vaccine 10/13/2015, 2/2/2015 Influenza Vaccine (Quad) PF 9/5/2017  8:51 AM  
 Pneumococcal Polysaccharide (PPSV-23) 2/2/2015 Not reviewed this visit You Were Diagnosed With   
  
 Codes Comments Vagina, candidiasis    -  Primary ICD-10-CM: B37.3 ICD-9-CM: 112.1 Vaginal discharge     ICD-10-CM: N89.8 ICD-9-CM: 623.5 Type 2 diabetes mellitus without complication, without long-term current use of insulin (HCC)     ICD-10-CM: E11.9 ICD-9-CM: 250.00 Upper respiratory tract infection, unspecified type     ICD-10-CM: J06.9 ICD-9-CM: 465.9 Laryngitis     ICD-10-CM: J04.0 ICD-9-CM: 464.00 Vitals BP Pulse Temp Resp Height(growth percentile) Weight(growth percentile) 110/70 (BP 1 Location: Left arm, BP Patient Position: Sitting) 91 98.6 °F (37 °C) (Oral) 14 5' 4.5\" (1.638 m) 157 lb 12.8 oz (71.6 kg) SpO2 BMI OB Status Smoking Status 97% 26.67 kg/m2 Chemically Induced Never Smoker BMI and BSA Data Body Mass Index Body Surface Area  
 26.67 kg/m 2 1.81 m 2 Preferred Pharmacy Pharmacy Name Phone [id number], [address] [phone] Your Updated Medication List  
  
   
This list is accurate as of: 12/4/17  8:52 AM.  Always use your most recent med list. YANA-SELTZER EXTRA STRENGTH PO Take  by mouth. fluconazole 150 mg tablet Commonly known as:  DIFLUCAN Take 1 Tab by mouth daily for 1 day. FDA advises cautious prescribing of oral fluconazole in pregnancy. iron aspgly,if-U-C44-FA-Ca-suc 015-78-17-6 mg-mg-mcg-mg Cap cap Commonly known as:  FERREX [address] Take 1 Cap by mouth daily. metFORMIN 500 mg tablet Commonly known as:  GLUCOPHAGE  
TAKE 1 TABLET BY MOUTH 2 TIMES DAILY WITH MEALS FOR SUGAR  
  
 methylPREDNISolone acetate 80 mg/mL injection Commonly known as:  DEPO-MEDROL 1 mL by IntraMUSCular route once for 1 dose. MIRENA 20 mcg/24 hr (5 years) Iud  
Generic drug:  levonorgestrel 1 Each by IntraUTERine route once. NYQUIL PO Take  by mouth. ONETOUCH VERIO strip Generic drug:  glucose blood VI test strips USE TO CHECK FASTING BLOOD SUGAR EVERY MORNING  
  
 OTHER One touch verio flex glucose machine Prescriptions Sent to Pharmacy Refills  
 fluconazole (DIFLUCAN) 150 mg tablet 0 Sig: Take 1 Tab by mouth daily for 1 day. FDA advises cautious prescribing of oral fluconazole in pregnancy. Class: Normal  
 Pharmacy: [id number] Misael Shearer, [address] Ph #: [phone] Route: Oral  
  
We Performed the Following HEMOGLOBIN A1C WITH EAG [31347 CPT(R)] METABOLIC PANEL, COMPREHENSIVE [58678 CPT(R)] METHYLPREDNISOLONE ACETATE INJECTION 80 MG [ [facility] [address] [id number] Custom] NM THER/PROPH/DIAG INJECTION, SUBCUT/IM [id number] CPT(R)] Follow-up Instructions Return in about 3 months (around 3/4/2018). Patient Instructions Laryngitis: Care Instructions Your Care Instructions Laryngitis is an inflammation of the voice box (larynx) that causes your voice to become raspy or hoarse. It can be short-lived or long-lasting. Most of the time, laryngitis comes on quickly and lasts as long as 2 weeks. It is caused by overuse, irritation, or infection of the vocal cords inside the larynx. Some of the most common causes are a cold, the flu, or allergies. Loud talking, shouting, cheering, or singing also can cause laryngitis. Stomach acid that backs up into the throat also can make you lose your voice. Resting your voice and taking other steps at home can help you get your voice back. Follow-up care is a key part of your treatment and safety. Be sure to make and go to all appointments, and call your doctor if you are having problems. It's also a good idea to know your test results and keep a list of the medicines you take. How can you care for yourself at home? · Follow your doctor's directions for treating the condition that caused you to lose your voice. If your doctor prescribed antibiotics, take them as directed. Do not stop taking them just because you feel better. You need to take the full course of antibiotics. · Before you use cough and cold medicines, check the label. They may not be safe for young children or for people with certain health problems. · Try to keep stomach acid from backing up into your throat. Do not eat just before bedtime. Reduce the amount of coffee and alcohol you drink, and eat healthy foods. Taking over-the-counter acid reducers can help when these steps are not enough. In some cases, you may need prescription medicine. · Rest your voice. You do not have to stop speaking, but use your voice as little as possible. Speak softly but do not whisper; whispering can bother your larynx more than speaking softly. Avoid talking on the telephone or trying to speak loudly. · Try not to clear your throat.  This can cause more irritation of your larynx. Take an over-the-counter cough suppressant (if your doctor recommends it) if you have a dry cough that does not produce mucus. · Do not smoke or allow others to smoke around you. If you need help quitting, talk to your doctor about stop-smoking programs and medicines. These can increase your chances of quitting for good. · Use a humidifier or vaporizer to add moisture to your bedroom. Humidity helps to thin the mucus in the nasal membranes that causes stuffiness or postnasal drip. Follow the directions for cleaning the machine. · Drink plenty of fluids, enough so that your urine is light yellow or clear like water. If you have kidney, heart, or liver disease and have to limit fluids, talk with your doctor before you increase the amount of fluids you drink. · Use saline (saltwater) nasal washes to help keep your nasal passages open and wash out mucus and bacteria. You can buy saline nose drops at a grocery store or drugstore. Or, you can make your own at home by mixing ½ teaspoon salt, 1 cup water (at room temperature), and ½ teaspoon baking soda. If you make your own, fill a bulb syringe with the solution, insert the tip into your nostril, and squeeze gently. Evone Bread your nose. When should you call for help? Call 911 anytime you think you may need emergency care. For example, call if: 
? · You have trouble breathing. ?Call your doctor now or seek immediate medical care if: 
? · You have new or worse pain. ? · You have trouble swallowing. ? Watch closely for changes in your health, and be sure to contact your doctor if: 
? · You do not get better as expected. Where can you learn more? Go to http://pino-kenya.info/. Enter R535 in the search box to learn more about \"Laryngitis: Care Instructions. \" Current as of: May 12, 2017 Content Version: 11.4 © 9164-3692 Healthwise, Incorporated.  Care instructions adapted under license by [address] (which disclaims liability or warranty for this information). If you have questions about a medical condition or this instruction, always ask your healthcare professional. [address] any warranty or liability for your use of this information. Vaginal Yeast Infection: Care Instructions Your Care Instructions A vaginal yeast infection is caused by too many yeast cells in the vagina. This is common in women of all ages. Itching, vaginal discharge and irritation, and other symptoms can bother you. But yeast infections don't often cause other health problems. Some medicines can increase your risk of getting a yeast infection. These include antibiotics, birth control pills, hormones, and steroids. You may also be more likely to get a yeast infection if you are pregnant, have diabetes, douche, or wear tight clothes. With treatment, most yeast infections get better in 2 to 3 days. Follow-up care is a key part of your treatment and safety. Be sure to make and go to all appointments, and call your doctor if you are having problems. It's also a good idea to know your test results and keep a list of the medicines you take. How can you care for yourself at home? · Take your medicines exactly as prescribed. Call your doctor if you think you are having a problem with your medicine. · Ask your doctor about over-the-counter (OTC) medicines for yeast infections. They may cost less than prescription medicines. If you use an OTC treatment, read and follow all instructions on the label. · Do not use tampons while using a vaginal cream or suppository. The tampons can absorb the medicine. Use pads instead. · Wear loose cotton clothing. Do not wear nylon or other fabric that holds body heat and moisture close to the skin. · Try sleeping without underwear. · Do not scratch. Relieve itching with a cold pack or a cool bath. · Do not wash your vaginal area more than once a day. Use plain water or a mild, unscented soap. Air-dry the vaginal area. · Change out of wet swimsuits after swimming. · Do not have sex until you have finished your treatment. · Do not douche. When should you call for help? Call your doctor now or seek immediate medical care if: 
? · You have unexpected vaginal bleeding. ? · You have new or increased pain in your vagina or pelvis. ? Watch closely for changes in your health, and be sure to contact your doctor if: 
? · You have a fever. ? · You are not getting better after 2 days. ? · Your symptoms come back after you finish your medicines. Where can you learn more? Go to http://pino-kenya.info/. Enter S421 in the search box to learn more about \"Vaginal Yeast Infection: Care Instructions. \" Current as of: October 13, 2016 Content Version: 11.4 © 5219-0887 [employer]. Care instructions adapted under license by [employer] (which disclaims liability or warranty for this information). If you have questions about a medical condition or this instruction, always ask your healthcare professional. [address] any warranty or liability for your use of this information. If you have any questions regarding [email or web address], you may call [email or web address].com support at [phone]. Introducing [facility]! [employer] introduces BetterWorks (Closed) patient portal. Now you can access parts of your medical record, email your doctor's office, and request medication refills online. 1. In your internet browser, go to [email or web address] 2. Click on the First Time User? Click Here link in the Sign In box. You will see the New Member Sign Up page. 3. Enter your BetterWorks (Closed) Access Code exactly as it appears below. You will not need to use this code after youve completed the sign-up process.  If you do not sign up before the expiration date, you must request a new code. · Kinesense Access Code: [email or web address] Expires: 3/4/2018  8:52 AM 
 
4. Enter the last four digits of your Social Security Number (xxxx) and Date of Birth (mm/dd/yyyy) as indicated and click Submit. You will be taken to the next sign-up page. 5. Create a Kinesense ID. This will be your Kinesense login ID and cannot be changed, so think of one that is secure and easy to remember. 6. Create a Kinesense password. You can change your password at any time. 7. Enter your Password Reset Question and Answer. This can be used at a later time if you forget your password. 8. Enter your e-mail address. You will receive e-mail notification when new information is available in [address] 9. Click Sign Up. You can now view and download portions of your medical record. 10. Click the Download Summary menu link to download a portable copy of your medical information. If you have questions, please visit the Frequently Asked Questions section of the Kinesense website. Remember, Kinesense is NOT to be used for urgent needs. For medical emergencies, dial 911. Now available from your iPhone and Android! Please provide this summary of care documentation to your next provider. Your primary care clinician is listed as Mel Tamez. If you have any questions after today's visit, please call [phone].

## 2017-12-04 NOTE — LETTER
NOTIFICATION RETURN TO WORK / SCHOOL 
 
12/4/2017 8:53 AM 
 
Ms. [address] [place] [phone] To Whom It May Concern: 
 
Ann-Marie Hawley is currently under the care of Renet[facility] [place]. She will return to work/school on: 12/06/2017. If there are questions or concerns please have the patient contact our office. Sincerely, Micheal Hernandez NP

## 2017-12-07 DIAGNOSIS — N76.0 BACTERIAL VAGINOSIS: ICD-10-CM

## 2017-12-07 DIAGNOSIS — A59.9 TRICHOMONAS INFECTION: Primary | ICD-10-CM

## 2017-12-07 DIAGNOSIS — B96.89 BACTERIAL VAGINOSIS: ICD-10-CM

## 2017-12-07 LAB
A VAGINAE DNA VAG QL NAA+PROBE: ABNORMAL SCORE
ALBUMIN SERPL-MCNC: 4.4 G/DL (ref 3.5–5.5)
ALBUMIN/GLOB SERPL: 1.6 {RATIO} (ref 1.2–2.2)
ALP SERPL-CCNC: 87 IU/L (ref 39–117)
ALT SERPL-CCNC: 11 IU/L (ref 0–32)
AST SERPL-CCNC: 13 IU/L (ref 0–40)
BILIRUB SERPL-MCNC: 0.4 MG/DL (ref 0–1.2)
BUN SERPL-MCNC: 8 MG/DL (ref 6–24)
BUN/CREAT SERPL: 9 (ref 9–23)
BVAB2 DNA VAG QL NAA+PROBE: ABNORMAL SCORE
C ALBICANS DNA VAG QL NAA+PROBE: NEGATIVE
C GLABRATA DNA VAG QL NAA+PROBE: NEGATIVE
C TRACH RRNA SPEC QL NAA+PROBE: NEGATIVE
CALCIUM SERPL-MCNC: 9 MG/DL (ref 8.7–10.2)
CHLORIDE SERPL-SCNC: 99 MMOL/L (ref 96–106)
CO2 SERPL-SCNC: 26 MMOL/L (ref 18–29)
CREAT SERPL-MCNC: 0.86 MG/DL (ref 0.57–1)
EST. AVERAGE GLUCOSE BLD GHB EST-MCNC: 160 MG/DL
GFR SERPLBLD CREATININE-BSD FMLA CKD-EPI: 82 ML/MIN/1.73
GFR SERPLBLD CREATININE-BSD FMLA CKD-EPI: 94 ML/MIN/1.73
GLOBULIN SER CALC-MCNC: 2.8 G/DL (ref 1.5–4.5)
GLUCOSE SERPL-MCNC: 148 MG/DL (ref 65–99)
HBA1C MFR BLD: 7.2 % (ref 4.8–5.6)
MEGA1 DNA VAG QL NAA+PROBE: ABNORMAL SCORE
N GONORRHOEA RRNA SPEC QL NAA+PROBE: NEGATIVE
POTASSIUM SERPL-SCNC: 3.8 MMOL/L (ref 3.5–5.2)
PROT SERPL-MCNC: 7.2 G/DL (ref 6–8.5)
SODIUM SERPL-SCNC: 140 MMOL/L (ref 134–144)
T VAGINALIS RRNA SPEC QL NAA+PROBE: POSITIVE

## 2017-12-07 RX ORDER — METRONIDAZOLE 500 MG/1
500 TABLET ORAL 3 TIMES DAILY
Qty: 21 TAB | Refills: 0 | Status: SHIPPED | OUTPATIENT
Start: 2017-12-07 | End: 2017-12-14

## 2017-12-07 NOTE — PROGRESS NOTES
Patient has been informed of these results. Please inform health department of the positive Trichomonas. Treating with Metronidazole 500 mg TID to cover Trichomonas and BV. She plans to tighten up on her evening dose of Metformin.

## 2018-01-31 ENCOUNTER — OFFICE VISIT (OUTPATIENT)
Dept: FAMILY MEDICINE CLINIC | Age: 48
End: 2018-01-31

## 2018-01-31 VITALS
WEIGHT: 147 LBS | HEIGHT: 64 IN | TEMPERATURE: 98.6 F | BODY MASS INDEX: 25.1 KG/M2 | OXYGEN SATURATION: 98 % | SYSTOLIC BLOOD PRESSURE: 110 MMHG | DIASTOLIC BLOOD PRESSURE: 72 MMHG | HEART RATE: 84 BPM

## 2018-01-31 DIAGNOSIS — R05.9 COUGH: ICD-10-CM

## 2018-01-31 DIAGNOSIS — R68.89 FLU-LIKE SYMPTOMS: Primary | ICD-10-CM

## 2018-01-31 DIAGNOSIS — R50.9 FEVER, UNSPECIFIED FEVER CAUSE: ICD-10-CM

## 2018-01-31 LAB
FLUAV+FLUBV AG NOSE QL IA.RAPID: NEGATIVE POS/NEG
FLUAV+FLUBV AG NOSE QL IA.RAPID: NEGATIVE POS/NEG
VALID INTERNAL CONTROL?: YES

## 2018-01-31 RX ORDER — PROMETHAZINE HYDROCHLORIDE AND CODEINE PHOSPHATE 6.25; 1 MG/5ML; MG/5ML
5 SOLUTION ORAL
Qty: 120 ML | Refills: 1 | Status: SHIPPED | OUTPATIENT
Start: 2018-01-31 | End: 2018-03-02

## 2018-01-31 NOTE — MR AVS SNAPSHOT
[facility] 
 
 
 [address] [address] [phone] Patient: Latrell Manley MRN: [id number] QIE:[id number] Visit Information Date & Time Provider Department Dept. Phone Encounter #  
 1/31/2018 11:10 AM Irasema Layne MD [address] [place] [address] [id number] Follow-up Instructions Return if symptoms worsen or fail to improve. Follow-up and Disposition History Your Appointments 3/2/2018  1:00 PM  
ESTABLISHED PATIENT with KEENA Watson (Lucio Gunn) Appt Note: 3 mo f/u  
 [address] [address] [phone]  
  
   
 [address] [place] [address],[address] [address] Upcoming Health Maintenance Date Due  
 PAP AKA CERVICAL CYTOLOGY 4/13/2018 FOOT EXAM Q1 3/6/2018 HEMOGLOBIN A1C Q6M 6/4/2018 MICROALBUMIN Q1 9/5/2018 EYE EXAM RETINAL OR DILATED Q1 9/5/2018 LIPID PANEL Q1 9/5/2018 BREAST CANCER SCRN MAMMOGRAM 11/10/2019 DTaP/Tdap/Td series (2 - Td) 9/5/2027 Allergies as of 1/31/2018  Review Complete On: 1/31/2018 By: Irasema Layne MD  
 No Known Allergies Current Immunizations  Reviewed on 9/5/2017 Name Date Influenza Vaccine 10/13/2015, 2/2/2015 Influenza Vaccine (Quad) PF 9/5/2017  8:51 AM  
 Pneumococcal Polysaccharide (PPSV-23) 2/2/2015 Not reviewed this visit You Were Diagnosed With   
  
 Codes Comments Flu-like symptoms    -  Primary ICD-10-CM: R68.89 ICD-9-CM: 780.99 Cough     ICD-10-CM: R05 ICD-9-CM: 786.2 Fever, unspecified fever cause     ICD-10-CM: R50.9 ICD-9-CM: 780.60 Vitals BP Pulse Temp Height(growth percentile) Weight(growth percentile) SpO2  
 110/72 84 98.6 °F (37 °C) (Oral) 5' 4\" (1.626 m) 147 lb (66.7 kg) 98% BMI OB Status Smoking Status 25.23 kg/m2 Chemically Induced Never Smoker BMI and BSA Data Body Mass Index Body Surface Area 25.23 kg/m 2 1.74 m 2 Preferred Pharmacy [facility] Phone [id number], [address] [place] [phone] Your Updated Medication List  
  
   
This list is accurate as of: 1/31/18  1:19 PM.  Always use your most recent med list. YANA-SELTZER EXTRA STRENGTH PO Take  by mouth.  
  
 iron aspgly,fc-J-B87-FA-Ca-suc 011-87-68-6 mg-mg-mcg-mg Cap cap Commonly known as:  FERREX [address] Take 1 Cap by mouth daily. lancets 33 gauge Misc E11.9, Use to check Glucose daily. Once touch Delica lancet  
  
 metFORMIN 500 mg tablet Commonly known as:  GLUCOPHAGE  
TAKE 1 TABLET BY MOUTH 2 TIMES DAILY WITH MEALS FOR SUGAR  
  
 MIRENA 20 mcg/24 hr (5 years) Iud  
Generic drug:  levonorgestrel 1 Each by IntraUTERine route once. ONETOUCH VERIO strip Generic drug:  glucose blood VI test strips  
by Does Not Apply route See Admin Instructions. DX E11.9 use to check glucose daily OTHER One touch verio flex glucose machine  
  
 promethazine-codeine 6.25-10 mg/5 mL syrup Commonly known as:  PHENERGAN with CODEINE Take 5 mL by mouth four (4) times daily as needed for Cough. Max Daily Amount: 20 mL. Prescriptions Printed Refills  
 promethazine-codeine (PHENERGAN WITH CODEINE) 6.25-10 mg/5 mL syrup 1 Sig: Take 5 mL by mouth four (4) times daily as needed for Cough. Max Daily Amount: 20 mL. Class: Print Route: Oral  
  
We Performed the Following AMB POC BRIAN INFLUENZA A/B TEST [92030 CPT(R)] Follow-up Instructions Return if symptoms worsen or fail to improve. To-Do List   
 01/31/2018 Lab:  CBC WITH AUTOMATED DIFF   
  
 01/31/2018 Imaging:  XR CHEST PA LAT Introducing [facility] & HE[facility] SERVICES! [facility] [employer] introduces [employer] patient portal. Now you can access parts of your medical record, email your doctor's office, and request medication refills online. 1. In your internet browser, go to [email or web address] 2. Click on the First Time User? Click Here link in the Sign In box. You will see the New Member Sign Up page. 3. Enter your Me-Mover Access Code exactly as it appears below. You will not need to use this code after youve completed the sign-up process. If you do not sign up before the expiration date, you must request a new code. · Me-Mover Access Code: [email or web address] Expires: 3/4/2018  8:52 AM 
 
4. Enter the last four digits of your Social Security Number (xxxx) and Date of Birth (mm/dd/yyyy) as indicated and click Submit. You will be taken to the next sign-up page. 5. Create a Me-Mover ID. This will be your Me-Mover login ID and cannot be changed, so think of one that is secure and easy to remember. 6. Create a Me-Mover password. You can change your password at any time. 7. Enter your Password Reset Question and Answer. This can be used at a later time if you forget your password. 8. Enter your e-mail address. You will receive e-mail notification when new information is available in [address] 9. Click Sign Up. You can now view and download portions of your medical record. 10. Click the Download Summary menu link to download a portable copy of your medical information. If you have questions, please visit the Frequently Asked Questions section of the Me-Mover website. Remember, Me-Mover is NOT to be used for urgent needs. For medical emergencies, dial 911. Now available from your iPhone and Android! Please provide this summary of care documentation to your next provider. Your primary care clinician is listed as Glenn Gerardo. If you have any questions after today's visit, please call [phone].

## 2018-01-31 NOTE — PROGRESS NOTES
Adia Barrios is a 57 y.o. female presenting for/with:    Cough (tried all over the counter without any help.) and URI    HPI:  Symptoms include productive cough, non-bloody, for past month or so, had about 2 weeks of improvement, then started to have more post-nasal drip and cough again. No dyspnea. No chills, last fever was 2 days ago to 101F, had some sweaty chills with that. Evaluation to date: none. Treatment to date: OTC products- nyquil, mucinex DM, APAP sinus helps a little, but not great. PMH, SH, Medications/Allergies: reviewed, on chart. Did get flu shot. ROS:  Constitutional: No fever, chills or abn weight loss- has been eating healthier  Respiratory: No cough, SOB   CV: No chest pain or Palpitations    Visit Vitals    /72    Pulse 84    Temp 98.6 °F (37 °C) (Oral)    Ht 5' 4\" (1.626 m)    Wt 147 lb (66.7 kg)    SpO2 98%    BMI 25.23 kg/m2     Wt Readings from Last 3 Encounters:   01/31/18 147 lb (66.7 kg)   12/04/17 157 lb 12.8 oz (71.6 kg)   09/05/17 159 lb 6.4 oz (72.3 kg)   -10#  BP Readings from Last 3 Encounters:   01/31/18 110/72   12/04/17 110/70   09/05/17 135/85     Physical Examination: General appearance - alert, well appearing, and in no distress  Mental status - alert, oriented to person, place, and time  Eyes - pupils equal and reactive, extraocular eye movements intact  ENT - bilateral external ears and nose normal. Normal lips  Neck - supple, no significant adenopathy, no thyromegaly or mass  Lymphatics - no palpable lymphadenopathy, no hepatosplenomegaly  Chest - clear to auscultation, no wheezes, rales or rhonchi, symmetric air entry  Heart - normal rate, regular rhythm, normal S1, S2, no murmurs, rubs, clicks or gallops  Extremities - peripheral pulses normal, no pedal edema, no clubbing or cyanosis    Rapid Flu: NEG    A/p:  Cough, chest congestion, fever a couple days ago, with reassuring exam today, but sx for past mo are concerning. Check CXR, CBC at [facility].  Con't OTC's for now.  Further tx per studies

## 2018-03-02 ENCOUNTER — OFFICE VISIT (OUTPATIENT)
Dept: FAMILY MEDICINE CLINIC | Age: 48
End: 2018-03-02

## 2018-03-02 VITALS
OXYGEN SATURATION: 99 % | RESPIRATION RATE: 18 BRPM | TEMPERATURE: 98 F | DIASTOLIC BLOOD PRESSURE: 62 MMHG | WEIGHT: 147 LBS | BODY MASS INDEX: 25.1 KG/M2 | HEIGHT: 64 IN | SYSTOLIC BLOOD PRESSURE: 108 MMHG | HEART RATE: 91 BPM

## 2018-03-02 DIAGNOSIS — E11.9 TYPE 2 DIABETES MELLITUS WITHOUT COMPLICATION, WITHOUT LONG-TERM CURRENT USE OF INSULIN (HCC): Primary | ICD-10-CM

## 2018-03-02 DIAGNOSIS — D50.0 IRON DEFICIENCY ANEMIA DUE TO CHRONIC BLOOD LOSS: ICD-10-CM

## 2018-03-02 DIAGNOSIS — E78.5 HYPERLIPIDEMIA, UNSPECIFIED HYPERLIPIDEMIA TYPE: ICD-10-CM

## 2018-03-02 RX ORDER — METFORMIN HYDROCHLORIDE 500 MG/1
1000 TABLET ORAL
Qty: 180 TAB | Refills: 3 | Status: SHIPPED | OUTPATIENT
Start: 2018-03-02 | End: 2019-05-14 | Stop reason: SDUPTHER

## 2018-03-02 NOTE — MR AVS SNAPSHOT
[address] [facility] [phone] Patient: Ting Martin MRN: [id number] ICP:1/36/1146 Visit Information Date & Time Provider Department Dept. Phone Encounter #  
 3/2/2018  1:00 PM Cherise Helm NP [facility] [address] [phone] [id number] Follow-up Instructions Return in about 3 months (around 6/2/2018). Follow-up and Disposition History Upcoming Health Maintenance Date Due  
 FOOT EXAM Q1 3/6/2018 PAP AKA CERVICAL CYTOLOGY 4/13/2018 HEMOGLOBIN A1C Q6M 6/4/2018 MICROALBUMIN Q1 9/5/2018 EYE EXAM RETINAL OR DILATED Q1 9/5/2018 LIPID PANEL Q1 9/5/2018 BREAST CANCER SCRN MAMMOGRAM 11/10/2019 DTaP/Tdap/Td series (2 - Td) 9/5/2027 Allergies as of 3/2/2018  Review Complete On: 3/2/2018 By: Cherise Helm NP No Known Allergies Current Immunizations  Reviewed on 9/5/2017 Name Date Influenza Vaccine 10/13/2015, 2/2/2015 Influenza Vaccine (Quad) PF 9/5/2017  8:51 AM  
 Pneumococcal Polysaccharide (PPSV-23) 2/2/2015 Not reviewed this visit You Were Diagnosed With   
  
 Codes Comments Type 2 diabetes mellitus without complication, without long-term current use of insulin (HCC)    -  Primary ICD-10-CM: E11.9 ICD-9-CM: 250.00 Hyperlipidemia, unspecified hyperlipidemia type     ICD-10-CM: E78.5 ICD-9-CM: 272.4 Iron deficiency anemia due to chronic blood loss     ICD-10-CM: D50.0 ICD-9-CM: 280.0 Vitals BP Pulse Temp Resp Height(growth percentile) Weight(growth percentile) 108/62 (BP 1 Location: Left arm, BP Patient Position: Sitting) 91 98 °F (36.7 °C) (Oral) 18 5' 4\" (1.626 m) 147 lb (66.7 kg) SpO2 BMI OB Status Smoking Status 99% 25.23 kg/m2 Chemically Induced Never Smoker Vitals History BMI and BSA Data Body Mass Index Body Surface Area  
 25.23 kg/m 2 1.74 m 2 Preferred Pharmacy Pharmacy Name Phone [facility]/PHARMACY #[id number] Smith [address] [phone] Your Updated Medication List  
  
   
This list is accurate as of 3/2/18  1:35 PM.  Always use your most recent med list.  
  
  
  
  
 iron aspgly,mk-I-U15-FA-Ca-suc 099-16-69-5 mg-mg-mcg-mg Cap cap Commonly known as:  FERREX [address] Take 1 Cap by mouth daily. lancets 33 gauge Misc E11.9, Use to check Glucose daily. Once touch Delica lancet  
  
 metFORMIN 500 mg tablet Commonly known as:  GLUCOPHAGE Take 2 Tabs by mouth daily (with breakfast). TAKE 1 TABLET BY MOUTH 2 TIMES DAILY WITH MEALS FOR SUGAR  
  
 MIRENA 20 mcg/24 hr (5 years) Iud  
Generic drug:  levonorgestrel 1 Each by IntraUTERine route once. ONETOUCH VERIO strip Generic drug:  glucose blood VI test strips  
by Does Not Apply route See Admin Instructions. DX E11.9 use to check glucose daily OTHER One touch verio flex glucose machine Prescriptions Sent to Pharmacy Refills  
 iron aspgly,uu-D-I00-FA-Ca-suc (FERREX 150 FORTE PLUS) 904-86-55-1 mg-mg-mcg-mg cap cap 3 Sig: Take 1 Cap by mouth daily. Class: Normal  
 Pharmacy: [facility]/pharmacy #[id number] Angelina Smith, [address] Ph #: [phone] Route: Oral  
 metFORMIN (GLUCOPHAGE) 500 mg tablet 3 Sig: Take 2 Tabs by mouth daily (with breakfast). TAKE 1 TABLET BY MOUTH 2 TIMES DAILY WITH MEALS FOR SUGAR Class: Normal  
 Pharmacy: [facility]/pharmacy #[id number] Angelina Garcialen, [address] Ph #: [phone] Route: Oral  
  
We Performed the Following CBC WITH AUTOMATED DIFF [80513 CPT(R)] COLLECTION VENOUS BLOOD,VENIPUNCTURE S4073060 CPT(R)] HEMOGLOBIN A1C WITH EAG [81902 CPT(R)]  DIABETES FOOT EXAM [7 Custom] LIPID PANEL [83504 CPT(R)] METABOLIC PANEL, COMPREHENSIVE [33506 CPT(R)] Follow-up Instructions Return in about 3 months (around 6/2/2018). Patient Instructions Diabetes Foot Health: Care Instructions Your Care Instructions When you have diabetes, your feet need extra care and attention. Diabetes can damage the nerve endings and blood vessels in your feet, making you less likely to notice when your feet are injured. Diabetes also limits your body's ability to fight infection and get blood to areas that need it. If you get a minor foot injury, it could become an ulcer or a serious infection. With good foot care, you can prevent most of these problems. Caring for your feet can be quick and easy. Most of the care can be done when you are bathing or getting ready for bed. Follow-up care is a key part of your treatment and safety. Be sure to make and go to all appointments, and call your doctor if you are having problems. It's also a good idea to know your test results and keep a list of the medicines you take. How can you care for yourself at home? · Keep your blood sugar close to normal by watching what and how much you eat, monitoring blood sugar, taking medicines if prescribed, and getting regular exercise. · Do not smoke. Smoking affects blood flow and can make foot problems worse. If you need help quitting, talk to your doctor about stop-smoking programs and medicines. These can increase your chances of quitting for good. · Eat a diet that is low in fats. High fat intake can cause fat to build up in your blood vessels and decrease blood flow. · Inspect your feet daily for blisters, cuts, cracks, or sores. If you cannot see well, use a mirror or have someone help you. · Take care of your feet: 
[facility] AUTHORITY your feet every day. Use warm (not hot) water. Check the water temperature with your wrists or other part of your body, not your feet. ¨ Dry your feet well. Pat them dry. Do not rub the skin on your feet too hard. Dry well between your toes.  If the skin on your feet stays moist, bacteria or a fungus can grow, which can lead to infection. ¨ Keep your skin soft. Use moisturizing skin cream to keep the skin on your feet soft and prevent calluses and cracks. But do not put the cream between your toes, and stop using any cream that causes a rash. ¨ Clean underneath your toenails carefully. Do not use a sharp object to clean underneath your toenails. Use the blunt end of a nail file or other rounded tool. ¨ Trim and file your toenails straight across to prevent ingrown toenails. Use a nail clipper, not scissors. Use an emery board to smooth the edges. · Change socks daily. Socks without seams are best, because seams often rub the feet. You can find socks for people with diabetes from specialty catalogs. · Look inside your shoes every day for things like gravel or torn linings, which could cause blisters or sores. · Buy shoes that fit well: 
¨ Look for shoes that have plenty of space around the toes. This helps prevent bunions and blisters. ¨ Try on shoes while wearing the kind of socks you will usually wear with the shoes. ¨ Avoid plastic shoes. They may rub your feet and cause blisters. Good shoes should be made of materials that are flexible and breathable, such as leather or cloth. ¨ Break in new shoes slowly by wearing them for no more than an hour a day for several days. Take extra time to check your feet for red areas, blisters, or other problems after you wear new shoes. · Do not go barefoot. Do not wear sandals, and do not wear shoes with very thin soles. Thin soles are easy to puncture. They also do not protect your feet from hot pavement or cold weather. · Have your doctor check your feet during each visit. If you have a foot problem, see your doctor. Do not try to treat an early foot problem at home. Home remedies or treatments that you can buy without a prescription (such as corn removers) can be harmful. · Always get early treatment for foot problems. A minor irritation can lead to a major problem if not properly cared for early. When should you call for help? Call your doctor now or seek immediate medical care if: 
? · You have a foot sore, an ulcer or break in the skin that is not healing after 4 days, bleeding corns or calluses, or an ingrown toenail. ? · You have blue or black areas, which can mean bruising or blood flow problems. ? · You have peeling skin or tiny blisters between your toes or cracking or oozing of the skin. ? · You have a fever for more than 24 hours and a foot sore. ? · You have new numbness or tingling in your feet that does not go away after you move your feet or change positions. ? · You have unexplained or unusual swelling of the foot or ankle. ? Watch closely for changes in your health, and be sure to contact your doctor if: 
? · You cannot do proper foot care. Where can you learn more? Go to http://pino-kenya.info/. Enter A739 in the search box to learn more about \"Diabetes Foot Health: Care Instructions. \" Current as of: March 13, 2017 Content Version: 11.4 © 7218-2958 [employer]. Care instructions adapted under license by [employer] (which disclaims liability or warranty for this information). If you have questions about a medical condition or this instruction, always ask your healthcare professional. [place] [address] any warranty or liability for your use of this information. Introducing \[facility] [place] [facility]\"" & HEALTH SERVICES! Jamie Jefferson introduces [employer] patient portal. Now you can access parts of your medical record, email your doctor's office, and request medication refills online. 1. In your internet browser, go to [email or web address] 2. Click on the First Time User? Click Here link in the Sign In box. You will see the New Member Sign Up page. 3. Enter your myDrugCosts Access Code exactly as it appears below. You will not need to use this code after youve completed the sign-up process. If you do not sign up before the expiration date, you must request a new code. · myDrugCosts Access Code: [email or web address] Expires: 3/4/2018  8:52 AM 
 
4. Enter the last four digits of your Social Security Number (xxxx) and Date of Birth (mm/dd/yyyy) as indicated and click Submit. You will be taken to the next sign-up page. 5. Create a myDrugCosts ID. This will be your myDrugCosts login ID and cannot be changed, so think of one that is secure and easy to remember. 6. Create a myDrugCosts password. You can change your password at any time. 7. Enter your Password Reset Question and Answer. This can be used at a later time if you forget your password. 8. Enter your e-mail address. You will receive e-mail notification when new information is available in [address]. 9. Click Sign Up. You can now view and download portions of your medical record. 10. Click the Download Summary menu link to download a portable copy of your medical information. If you have questions, please visit the Frequently Asked Questions section of the myDrug[employer]s website. Remember, myDrug[employer]s is NOT to be used for urgent needs. For medical emergencies, dial 911. Now available from your iPhone and Android! Please provide this summary of care documentation to your next provider. Your primary care clinician is listed as Kasia Lopez. If you have any questions after today's visit, please call [phone].

## 2018-03-02 NOTE — PROGRESS NOTES
Chief Complaint   Patient presents with    Diabetes     3 month follow up          HPI:       is a 57 y.o. female. [employer] employee in [place]. Presents for routine f/u      Diabetes: Metformin 500 mg bid. Misses doses occasionally at night. No adverse effects. Her BG has been running 120 when fasting and 170-180s after meals. Last A1C 6.9%     Lipids are very good. No therapy. Iron deficiency: Doing well on iron. Mammogram: 10/2017. Normal.    Eye exam: 09/2017   Last pap 2017 was normal.      New Issues:  Finally feeling better after having a cold. Is still not doing great with her nighttime dose of Metformin. Thinks it might be better if she takes both in the morning. No Known Allergies    Current Outpatient Prescriptions   Medication Sig    ONETOUCH VERIO strip by Does Not Apply route See Admin Instructions. DX E11.9 use to check glucose daily    lancets 33 gauge misc E11.9, Use to check Glucose daily. Once touch Delica lancet    metFORMIN (GLUCOPHAGE) 500 mg tablet TAKE 1 TABLET BY MOUTH 2 TIMES DAILY WITH MEALS FOR SUGAR    iron aspgly,ma-I-C59-FA-Ca-suc (FERREX 150 FORTE PLUS) 654-54-57-1 mg-mg-mcg-mg cap cap Take 1 Cap by mouth daily.  levonorgestrel (MIRENA) 20 mcg/24 hr (5 years) IUD 1 Each by IntraUTERine route once.  OTHER One touch verio flex glucose machine     No current facility-administered medications for this visit. Past Medical History:   Diagnosis Date    Anemia     Diabetes ([address]        No past surgical history on file.     Social History     Social History    Marital status: SINGLE     Spouse name: N/A    Number of children: N/A    Years of education: N/A     Social History Main Topics    Smoking status: Never Smoker    Smokeless tobacco: Never Used    Alcohol use Yes    Drug use: No    Sexual activity: Yes     Other Topics Concern    None     Social History Narrative       Family History   Problem Relation Age of Onset    Diabetes Mother     Diabetes Father        Above history reviewed. ROS:  Denies fever, chills, cough, chest pain, SOB,  nausea, vomiting, or diarrhea. Denies wt loss, wt gain, hemoptysis, hematochezia or melena. Physical Examination:    /62 (BP 1 Location: Left arm, BP Patient Position: Sitting)  Pulse 91  Temp 98 °F (36.7 °C) (Oral)   Resp 18  Ht 5' 4\" (1.626 m)  Wt 147 lb (66.7 kg)  SpO2 99%  BMI 25.23 kg/m2    General: Alert and Ox3, Fluent speech  HEENT:  PERRLA, EOM intact, TMs, turbinates, pharynx normal.  No thyromegaly. No cervical adenopathy. Neck:  Supple, no adenopathy, JVD, mass or bruit  Chest:  Clear to Ausculation, without wheezes, rales, rubs or ronchi  Cardiac: RRR  Extremities:  No cyanosis, clubbing or edema  Neurologic:  Ambulatory without assist, CN 2-12 grossly intact. Moves all extremities. Skin: no rash  Lymphadenopathy: no cervical or supraclavicular nodes  Diabetic Foot Exam:  Both feet are examined and demonstrate intact DP pulses. There is good capillary refill and sensation is intact. Skin is intact and there are no ulcers or sores. ASSESSMENT AND PLAN:     1. Type 2 diabetes mellitus without complication, without long-term current use of insulin (HCC)  Changing Metformin to 2 tabs in the morning since she keeps forgetting the evening dose. - metFORMIN (GLUCOPHAGE) 500 mg tablet; Take 2 Tabs by mouth daily (with breakfast). TAKE 1 TABLET BY MOUTH 2 TIMES DAILY WITH MEALS FOR SUGAR  Dispense: 180 Tab; Refill: 3  -  DIABETES FOOT EXAM  - METABOLIC PANEL, COMPREHENSIVE  - HEMOGLOBIN A1C WITH EAG  - COLLECTION VENOUS BLOOD,VENIPUNCTURE    2. Hyperlipidemia, unspecified hyperlipidemia type  Good control  Continue current regimen   - LIPID PANEL    3. Iron deficiency anemia due to chronic blood loss  Good control  Continue current regimen   - iron aspgly,dp-X-T03-FA-Ca-suc (FERREX 150 FORTE PLUS) 150-60-25-1 mg-mg-mcg-mg cap cap; Take 1 Cap by mouth daily.   Dispense: 90 Cap;  Refill: 3  - CBC WITH AUTOMATED DIFF     RTC in 3 months    Dianna Agee, NP

## 2018-03-02 NOTE — PATIENT INSTRUCTIONS
Diabetes Foot Health: Care Instructions  Your Care Instructions    When you have diabetes, your feet need extra care and attention. Diabetes can damage the nerve endings and blood vessels in your feet, making you less likely to notice when your feet are injured. Diabetes also limits your body's ability to fight infection and get blood to areas that need it. If you get a minor foot injury, it could become an ulcer or a serious infection. With good foot care, you can prevent most of these problems. Caring for your feet can be quick and easy. Most of the care can be done when you are bathing or getting ready for bed. Follow-up care is a key part of your treatment and safety. Be sure to make and go to all appointments, and call your doctor if you are having problems. It's also a good idea to know your test results and keep a list of the medicines you take. How can you care for yourself at home? · Keep your blood sugar close to normal by watching what and how much you eat, monitoring blood sugar, taking medicines if prescribed, and getting regular exercise. · Do not smoke. Smoking affects blood flow and can make foot problems worse. If you need help quitting, talk to your doctor about stop-smoking programs and medicines. These can increase your chances of quitting for good. · Eat a diet that is low in fats. High fat intake can cause fat to build up in your blood vessels and decrease blood flow. · Inspect your feet daily for blisters, cuts, cracks, or sores. If you cannot see well, use a mirror or have someone help you. · Take care of your feet:  [facility] AUTHORITY your feet every day. Use warm (not hot) water. Check the water temperature with your wrists or other part of your body, not your feet. ¨ Dry your feet well. Pat them dry. Do not rub the skin on your feet too hard. Dry well between your toes. If the skin on your feet stays moist, bacteria or a fungus can grow, which can lead to infection.   ¨ Keep your skin soft. Use moisturizing skin cream to keep the skin on your feet soft and prevent calluses and cracks. But do not put the cream between your toes, and stop using any cream that causes a rash. ¨ Clean underneath your toenails carefully. Do not use a sharp object to clean underneath your toenails. Use the blunt end of a nail file or other rounded tool. ¨ Trim and file your toenails straight across to prevent ingrown toenails. Use a nail clipper, not scissors. Use an emery board to smooth the edges. · Change socks daily. Socks without seams are best, because seams often rub the feet. You can find socks for people with diabetes from specialty catalogs. · Look inside your shoes every day for things like gravel or torn linings, which could cause blisters or sores. · Buy shoes that fit well:  ¨ Look for shoes that have plenty of space around the toes. This helps prevent bunions and blisters. ¨ Try on shoes while wearing the kind of socks you will usually wear with the shoes. ¨ Avoid plastic shoes. They may rub your feet and cause blisters. Good shoes should be made of materials that are flexible and breathable, such as leather or cloth. ¨ Break in new shoes slowly by wearing them for no more than an hour a day for several days. Take extra time to check your feet for red areas, blisters, or other problems after you wear new shoes. · Do not go barefoot. Do not wear sandals, and do not wear shoes with very thin soles. Thin soles are easy to puncture. They also do not protect your feet from hot pavement or cold weather. · Have your doctor check your feet during each visit. If you have a foot problem, see your doctor. Do not try to treat an early foot problem at home. Home remedies or treatments that you can buy without a prescription (such as corn removers) can be harmful. · Always get early treatment for foot problems. A minor irritation can lead to a major problem if not properly cared for early.   When should you call for help? Call your doctor now or seek immediate medical care if:  ? · You have a foot sore, an ulcer or break in the skin that is not healing after 4 days, bleeding corns or calluses, or an ingrown toenail. ? · You have blue or black areas, which can mean bruising or blood flow problems. ? · You have peeling skin or tiny blisters between your toes or cracking or oozing of the skin. ? · You have a fever for more than 24 hours and a foot sore. ? · You have new numbness or tingling in your feet that does not go away after you move your feet or change positions. ? · You have unexplained or unusual swelling of the foot or ankle. ? Watch closely for changes in your health, and be sure to contact your doctor if:  ? · You cannot do proper foot care. Where can you learn more? Go to http://pino-kenya.info/. Enter A739 in the search box to learn more about \"Diabetes Foot Health: Care Instructions. \"  Current as of: March 13, 2017  Content Version: 11.4  © 1473-8443 [employer]. Care instructions adapted under license by [employer] (which disclaims liability or warranty for this information). If you have questions about a medical condition or this instruction, always ask your healthcare professional. [address] any warranty or liability for your use of this information.

## 2018-03-03 LAB
BASOPHILS # BLD AUTO: 0 X10E3/UL (ref 0–0.2)
BASOPHILS NFR BLD AUTO: 0 %
EOSINOPHIL # BLD AUTO: 0.1 X10E3/UL (ref 0–0.4)
EOSINOPHIL NFR BLD AUTO: 1 %
ERYTHROCYTE [DISTWIDTH] IN BLOOD BY AUTOMATED COUNT: 13.4 % (ref 12.3–15.4)
HCT VFR BLD AUTO: 33.9 % (ref 34–46.6)
HGB BLD-MCNC: 11.6 G/DL (ref 11.1–15.9)
LYMPHOCYTES # BLD AUTO: 1.3 X10E3/UL (ref 0.7–3.1)
LYMPHOCYTES NFR BLD AUTO: 31 %
MCH RBC QN AUTO: 28.2 PG (ref 26.6–33)
MCHC RBC AUTO-ENTMCNC: 34.2 G/DL (ref 31.5–35.7)
MCV RBC AUTO: 83 FL (ref 79–97)
MONOCYTES # BLD AUTO: 0.3 X10E3/UL (ref 0.1–0.9)
MONOCYTES NFR BLD AUTO: 7 %
NEUTROPHILS # BLD AUTO: 2.5 X10E3/UL (ref 1.4–7)
NEUTROPHILS NFR BLD AUTO: 61 %
PLATELET # BLD AUTO: 321 X10E3/UL (ref 150–379)
RBC # BLD AUTO: 4.11 X10E6/UL (ref 3.77–5.28)
WBC # BLD AUTO: 4.2 X10E3/UL (ref 3.4–10.8)

## 2018-03-04 LAB
ALBUMIN SERPL-MCNC: 4.4 G/DL (ref 3.5–5.5)
ALBUMIN/GLOB SERPL: 1.9 {RATIO} (ref 1.2–2.2)
ALP SERPL-CCNC: 70 IU/L (ref 39–117)
ALT SERPL-CCNC: 9 IU/L (ref 0–32)
AST SERPL-CCNC: 11 IU/L (ref 0–40)
BILIRUB SERPL-MCNC: 0.8 MG/DL (ref 0–1.2)
BUN SERPL-MCNC: 10 MG/DL (ref 6–24)
BUN/CREAT SERPL: 10 (ref 9–23)
CALCIUM SERPL-MCNC: 9 MG/DL (ref 8.7–10.2)
CHLORIDE SERPL-SCNC: 100 MMOL/L (ref 96–106)
CHOLEST SERPL-MCNC: 158 MG/DL (ref 100–199)
CO2 SERPL-SCNC: 23 MMOL/L (ref 18–29)
CREAT SERPL-MCNC: 1.03 MG/DL (ref 0.57–1)
EST. AVERAGE GLUCOSE BLD GHB EST-MCNC: 140 MG/DL
GFR SERPLBLD CREATININE-BSD FMLA CKD-EPI: 65 ML/MIN/1.73
GFR SERPLBLD CREATININE-BSD FMLA CKD-EPI: 75 ML/MIN/1.73
GLOBULIN SER CALC-MCNC: 2.3 G/DL (ref 1.5–4.5)
GLUCOSE SERPL-MCNC: 157 MG/DL (ref 65–99)
HBA1C MFR BLD: 6.5 % (ref 4.8–5.6)
HDLC SERPL-MCNC: 58 MG/DL
LDLC SERPL CALC-MCNC: 92 MG/DL (ref 0–99)
POTASSIUM SERPL-SCNC: 3.5 MMOL/L (ref 3.5–5.2)
PROT SERPL-MCNC: 6.7 G/DL (ref 6–8.5)
SODIUM SERPL-SCNC: 141 MMOL/L (ref 134–144)
TRIGL SERPL-MCNC: 38 MG/DL (ref 0–149)
VLDLC SERPL CALC-MCNC: 8 MG/DL (ref 5–40)

## 2018-03-05 ENCOUNTER — TELEPHONE (OUTPATIENT)
Dept: FAMILY MEDICINE CLINIC | Age: 48
End: 2018-03-05

## 2018-03-05 NOTE — PROGRESS NOTES
Cholesterol is good. Liver and kidneys are good. A1c has improved to 6.5. Good job. Blood count looks good.

## 2018-03-06 ENCOUNTER — TELEPHONE (OUTPATIENT)
Dept: FAMILY MEDICINE CLINIC | Age: 48
End: 2018-03-06

## 2018-03-06 NOTE — TELEPHONE ENCOUNTER
----- Message from Aaliyah Quick NP sent at 3/5/2018  9:10 AM EST -----  Cholesterol is good. Liver and kidneys are good. A1c has improved to 6.5. Good job. Blood count looks good.

## 2018-06-01 ENCOUNTER — OFFICE VISIT (OUTPATIENT)
Dept: FAMILY MEDICINE CLINIC | Age: 48
End: 2018-06-01

## 2018-06-01 VITALS
WEIGHT: 153 LBS | BODY MASS INDEX: 26.12 KG/M2 | HEART RATE: 107 BPM | HEIGHT: 64 IN | OXYGEN SATURATION: 97 % | RESPIRATION RATE: 18 BRPM | DIASTOLIC BLOOD PRESSURE: 72 MMHG | SYSTOLIC BLOOD PRESSURE: 117 MMHG

## 2018-06-01 DIAGNOSIS — E11.9 TYPE 2 DIABETES MELLITUS WITHOUT COMPLICATION, WITHOUT LONG-TERM CURRENT USE OF INSULIN (HCC): Primary | ICD-10-CM

## 2018-06-01 NOTE — PATIENT INSTRUCTIONS

## 2018-06-01 NOTE — MR AVS SNAPSHOT
[place] 
 
 
 [address] [address] [phone] Patient: Fredo Talbot MRN: [id number] JTZ:[id number] Visit Information Date & Time Provider Department Dept. Phone Encounter #  
 6/1/2018  8:00 AM Minal Meadows NP [facility] [address] [phone] [id number] Follow-up Instructions Return in about 3 months (around 9/1/2018). Follow-up and Disposition History Upcoming Health Maintenance Date Due Influenza Age 5 to Adult 8/1/2018 HEMOGLOBIN A1C Q6M 9/2/2018 MICROALBUMIN Q1 9/5/2018 EYE EXAM RETINAL OR DILATED Q1 9/5/2018 FOOT EXAM Q1 3/2/2019 LIPID PANEL Q1 3/2/2019 BREAST CANCER SCRN MAMMOGRAM 11/10/2019 PAP AKA CERVICAL CYTOLOGY 7/29/2020 DTaP/Tdap/Td series (2 - Td) 9/5/2027 Allergies as of 6/1/2018  Review Complete On: 6/1/2018 By: Minal Meadows NP No Known Allergies Current Immunizations  Reviewed on 9/5/2017 Name Date Influenza Vaccine 10/13/2015, 2/2/2015 Influenza Vaccine (Quad) PF 9/5/2017  8:51 AM  
 Pneumococcal Polysaccharide (PPSV-23) 2/2/2015 Not reviewed this visit You Were Diagnosed With   
  
 Codes Comments Type 2 diabetes mellitus without complication, without long-term current use of insulin (HCC)    -  Primary ICD-10-CM: E11.9 ICD-9-CM: 250.00 Vitals BP Pulse Resp Height(growth percentile) Weight(growth percentile) SpO2  
 117/72 (BP 1 Location: Left arm, BP Patient Position: Sitting) (!) 107 18 5' 4\" (1.626 m) 153 lb (69.4 kg) 97% BMI OB Status Smoking Status 26.26 kg/m2 Chemically Induced Never Smoker Vitals History BMI and BSA Data Body Mass Index Body Surface Area  
 26.26 kg/m 2 1.77 m 2 Preferred Pharmacy Pharmacy Name Phone [id number] [address] [phone] Your Updated Medication List  
  
   
 This list is accurate as of 6/1/18  8:41 AM.  Always use your most recent med list.  
  
  
  
  
 iron aspgly,ed-I-N65-FA-Ca-suc 219-28-98-0 mg-mg-mcg-mg Cap cap Commonly known as:  FERREX [address] Take 1 Cap by mouth daily. lancets 33 gauge Misc E11.9, Use to check Glucose daily. Once touch Delica lancet  
  
 metFORMIN 500 mg tablet Commonly known as:  GLUCOPHAGE Take 2 Tabs by mouth daily (with breakfast). TAKE 1 TABLET BY MOUTH 2 TIMES DAILY WITH MEALS FOR SUGAR  
  
 MIRENA 20 mcg/24 hr (5 years) Iud  
Generic drug:  levonorgestrel 1 Each by IntraUTERine route once. ONETOUCH VERIO strip Generic drug:  glucose blood VI test strips  
by Does Not Apply route See Admin Instructions. DX E11.9 use to check glucose daily OTHER One touch verio flex glucose machine We Performed the Following HEMOGLOBIN A1C WITH EAG [49805 CPT(R)] METABOLIC PANEL, COMPREHENSIVE [00651 CPT(R)] Follow-up Instructions Return in about 3 months (around 9/1/2018). Patient Instructions Nutrition Tips for Diabetes: After Your Visit Your Care Instructions A healthy diet is important to manage diabetes. It helps you lose weight (if you need to) and keep it off. It gives you the nutrition and energy your body needs and helps prevent heart disease. But a diet for diabetes does not mean that you have to eat special foods. You can eat what your family eats, including occasional sweets and other favorites. But you do have to pay attention to how often you eat and how much you eat of certain foods. The right plan for you will give you meals that help you keep your blood sugar at healthy levels. Try to eat a variety of foods and to spread carbohydrate throughout the day. Carbohydrate raises blood sugar higher and more quickly than any other nutrient does.  Carbohydrate is found in sugar, breads and cereals, fruit, starchy vegetables such as potatoes and corn, and milk and yogurt. You may want to work with a dietitian or diabetes educator to help you plan meals and snacks. A dietitian or diabetes educator also can help you lose weight if that is one of your goals. The following tips can help you enjoy your meals and stay healthy. Follow-up care is a key part of your treatment and safety. Be sure to make and go to all appointments, and call your doctor if you are having problems. Its also a good idea to know your test results and keep a list of the medicines you take. How can you care for yourself at home? · Learn which foods have carbohydrate and how much carbohydrate to eat. A dietitian or diabetes educator can help you learn to keep track of how much carbohydrate you eat. · Spread carbohydrate throughout the day. Eat some carbohydrate at all meals, but do not eat too much at any one time. · Plan meals to include food from all the food groups. These are the food groups and some example portion sizes: ¨ Grains: 1 slice of bread (1 ounce), ½ cup of cooked cereal, and 1/3 cup of cooked pasta or rice. These have about 15 grams of carbohydrate in a serving. Choose whole grains such as whole wheat bread or crackers, oatmeal, and brown rice more often than refined grains. ¨ Fruit: 1 small fresh fruit, such as an apple or orange; ½ of a banana; ½ cup of chopped, cooked, or canned fruit; ½ cup of fruit juice; 1 cup of melon or raspberries; and 2 tablespoons of dried fruit. These have about 15 grams of carbohydrate in a serving. ¨ Dairy: 1 cup of nonfat or low-fat milk and 2/3 cup of plain yogurt. These have about 15 grams of carbohydrate in a serving. ¨ Protein foods: Beef, chicken, turkey, fish, eggs, tofu, cheese, cottage cheese, and peanut butter. A serving size of meat is 3 ounces, which is about the size of a deck of cards.  Examples of meat substitute serving sizes (equal to 1 ounce of meat) are 1/4 cup of cottage cheese, 1 egg, 1 tablespoon of peanut butter, and ½ cup of tofu. These have very little or no carbohydrate per serving. ¨ Vegetables: Starchy vegetables such as ½ cup of cooked dried beans, peas, potatoes, or corn have about 15 grams of carbohydrate. Nonstarchy vegetables have very little carbohydrate, such as 1 cup of raw leafy vegetables (such as spinach), ½ cup of other vegetables (cooked or chopped), and 3/4 cup of vegetable juice. · Use the plate format to plan meals. It is a good, quick way to make sure that you have a balanced meal. It also helps you spread carbohydrate throughout the day. You divide your plate by types of foods. Put vegetables on half the plate, meat or meat substitutes on one-quarter of the plate, and a grain or starchy vegetable (such as brown rice or a potato) in the final quarter of the plate. To this you can add a small piece of fruit and 1 cup of milk or yogurt, depending on how much carbohydrate you are supposed to eat at a meal. 
· Talk to your dietitian or diabetes educator about ways to add limited amounts of sweets into your meal plan. You can eat these foods now and then, as long as you include the amount of carbohydrate they have in your daily carbohydrate allowance. · If you drink alcohol, limit it to no more than 1 drink a day for women and 2 drinks a day for men. If you are pregnant, no amount of alcohol is known to be safe. · Protein, fat, and fiber do not raise blood sugar as much as carbohydrate does. If you eat a lot of these nutrients in a meal, your blood sugar will rise more slowly than it would otherwise. · Limit saturated fats, such as those from meat and dairy products. Try to replace it with monounsaturated fat, such as olive oil. This is a healthier choice because people who have diabetes are at higher-than-average risk of heart disease.  But use a modest amount of olive oil. A tablespoon of olive oil has 14 grams of fat and 120 calories. · Exercise lowers blood sugar. If you take insulin by shots or pump, you can use less than you would if you were not exercising. Keep in mind that timing matters. If you exercise within 1 hour after a meal, your body may need less insulin for that meal than it would if you exercised 3 hours after the meal. Test your blood sugar to find out how exercise affects your need for insulin. · Exercise on most days of the week. Aim for at least 30 minutes. Exercise helps you stay at a healthy weight and helps your body use insulin. Walking is an easy way to get exercise. Gradually increase the amount you walk every day. You also may want to swim, bike, or do other activities. When you eat out · Learn to estimate the serving sizes of foods that have carbohydrate. If you measure food at home, it will be easier to estimate the amount in a serving of restaurant food. · If the meal you order has too much carbohydrate (such as potatoes, corn, or baked beans), ask to have a low-carbohydrate food instead. Ask for a salad or green vegetables. · If you use insulin, check your blood sugar before and after eating out to help you plan how much to eat in the future. · If you eat more carbohydrate at a meal than you had planned, take a walk or do other exercise. This will help lower your blood sugar. Where can you learn more? Go to [employer] Enter Z130 in the search box to learn more about \"Nutrition Tips for Diabetes: After Your Visit. \"  
© 2926-7972 Healthwise, Incorporated. Care instructions adapted under license by Parisa Lara (which disclaims liability or warranty for this information).  This care instruction is for use with your licensed healthcare professional. If you have questions about a medical condition or this instruction, always ask your healthcare professional. [employer] disclaims any warranty or liability for your use of this information. Content Version: 02.2.125210; Current as of: June 4, 2014 If you have any questions regarding [employer], you may call [employer] support at [phone]. Patient Instructions History Introducing [facility]! [employer] introduces [employer] patient portal. Now you can access parts of your medical record, email your doctor's office, and request medication refills online. 1. In your internet browser, go to [email or web address] 2. Click on the First Time User? Click Here link in the Sign In box. You will see the New Member Sign Up page. 3. Enter your [employer] Access Code exactly as it appears below. You will not need to use this code after youve completed the sign-up process. If you do not sign up before the expiration date, you must request a new code. · [employer] Access Code: [email or web address] Expires: 6/17/2018 10:21 AM 
 
4. Enter the last four digits of your Social Security Number (xxxx) and Date of Birth (mm/dd/yyyy) as indicated and click Submit. You will be taken to the next sign-up page. 5. Create a [employer] ID. This will be your [employer] login ID and cannot be changed, so think of one that is secure and easy to remember. 6. Create a [employer] password. You can change your password at any time. 7. Enter your Password Reset Question and Answer. This can be used at a later time if you forget your password. 8. Enter your e-mail address. You will receive e-mail notification when new information is available in [address] 9. Click Sign Up. You can now view and download portions of your medical record. 10. Click the Download Summary menu link to download a portable copy of your medical information. If you have questions, please visit the Frequently Asked Questions section of the [employer] website.  Remember, [employer] is NOT to be used for urgent needs. For medical emergencies, dial 911. Now available from your iPhone and Android! Please provide this summary of care documentation to your next provider. Your primary care clinician is listed as Peter Mims. If you have any questions after today's visit, please call [phone].

## 2018-06-02 LAB
ALBUMIN SERPL-MCNC: 4.2 G/DL (ref 3.5–5.5)
ALBUMIN/GLOB SERPL: 1.6 {RATIO} (ref 1.2–2.2)
ALP SERPL-CCNC: 71 IU/L (ref 39–117)
ALT SERPL-CCNC: 17 IU/L (ref 0–32)
AST SERPL-CCNC: 18 IU/L (ref 0–40)
BILIRUB SERPL-MCNC: 0.4 MG/DL (ref 0–1.2)
BUN SERPL-MCNC: 12 MG/DL (ref 6–24)
BUN/CREAT SERPL: 16 (ref 9–23)
CALCIUM SERPL-MCNC: 9 MG/DL (ref 8.7–10.2)
CHLORIDE SERPL-SCNC: 100 MMOL/L (ref 96–106)
CO2 SERPL-SCNC: 24 MMOL/L (ref 18–29)
CREAT SERPL-MCNC: 0.76 MG/DL (ref 0.57–1)
EST. AVERAGE GLUCOSE BLD GHB EST-MCNC: 157 MG/DL
GFR SERPLBLD CREATININE-BSD FMLA CKD-EPI: 109 ML/MIN/1.73
GFR SERPLBLD CREATININE-BSD FMLA CKD-EPI: 94 ML/MIN/1.73
GLOBULIN SER CALC-MCNC: 2.7 G/DL (ref 1.5–4.5)
GLUCOSE SERPL-MCNC: 147 MG/DL (ref 65–99)
HBA1C MFR BLD: 7.1 % (ref 4.8–5.6)
POTASSIUM SERPL-SCNC: 4.4 MMOL/L (ref 3.5–5.2)
PROT SERPL-MCNC: 6.9 G/DL (ref 6–8.5)
SODIUM SERPL-SCNC: 138 MMOL/L (ref 134–144)

## 2018-06-04 ENCOUNTER — TELEPHONE (OUTPATIENT)
Dept: FAMILY MEDICINE CLINIC | Age: 48
End: 2018-06-04

## 2018-06-04 NOTE — TELEPHONE ENCOUNTER
----- Message from Pavel Padron NP sent at 6/4/2018  8:35 AM EDT -----  Kidneys look great! Liver looks good. A1c is climbing up. Stick with the 2 tablets of Metformin in the morning. Tighten up on carbs, starches and sugar.

## 2018-06-04 NOTE — PROGRESS NOTES
Kidneys look great! Liver looks good. A1c is climbing up. Stick with the 2 tablets of Metformin in the morning. Tighten up on carbs, starches and sugar.

## 2019-11-25 PROBLEM — F43.21 SITUATIONAL DEPRESSION: Status: ACTIVE | Noted: 2019-11-25

## 2020-06-22 ENCOUNTER — TELEPHONE (OUTPATIENT)
Dept: PEDIATRICS CLINIC | Age: 50
End: 2020-06-22

## 2020-06-22 DIAGNOSIS — R92.8 ABNORMAL MAMMOGRAM OF LEFT BREAST: Primary | ICD-10-CM

## 2020-06-24 ENCOUNTER — TELEPHONE (OUTPATIENT)
Dept: PEDIATRICS CLINIC | Age: 50
End: 2020-06-24

## 2021-04-26 ENCOUNTER — OFFICE VISIT (OUTPATIENT)
Dept: FAMILY MEDICINE CLINIC | Age: 51
End: 2021-04-26
Payer: COMMERCIAL

## 2021-04-26 ENCOUNTER — TELEPHONE (OUTPATIENT)
Dept: FAMILY MEDICINE CLINIC | Age: 51
End: 2021-04-26

## 2021-04-26 VITALS
WEIGHT: 165.2 LBS | BODY MASS INDEX: 28.2 KG/M2 | RESPIRATION RATE: 20 BRPM | OXYGEN SATURATION: 97 % | SYSTOLIC BLOOD PRESSURE: 120 MMHG | TEMPERATURE: 97.5 F | HEART RATE: 110 BPM | DIASTOLIC BLOOD PRESSURE: 70 MMHG | HEIGHT: 64 IN

## 2021-04-26 DIAGNOSIS — D50.8 OTHER IRON DEFICIENCY ANEMIA: ICD-10-CM

## 2021-04-26 DIAGNOSIS — E11.9 TYPE 2 DIABETES MELLITUS WITHOUT COMPLICATION, WITHOUT LONG-TERM CURRENT USE OF INSULIN (HCC): Primary | ICD-10-CM

## 2021-04-26 DIAGNOSIS — Z11.59 NEED FOR HEPATITIS C SCREENING TEST: ICD-10-CM

## 2021-04-26 LAB
ALBUMIN SERPL-MCNC: 4.1 G/DL (ref 3.5–5)
ALBUMIN/GLOB SERPL: 1.2 {RATIO} (ref 1.1–2.2)
ALP SERPL-CCNC: 96 U/L (ref 45–117)
ALT SERPL-CCNC: 16 U/L (ref 12–78)
ANION GAP SERPL CALC-SCNC: 9 MMOL/L (ref 5–15)
AST SERPL-CCNC: 14 U/L (ref 15–37)
BASOPHILS # BLD: 0 K/UL (ref 0–0.1)
BASOPHILS NFR BLD: 0 % (ref 0–1)
BILIRUB SERPL-MCNC: 0.5 MG/DL (ref 0.2–1)
BUN SERPL-MCNC: 11 MG/DL (ref 6–20)
BUN/CREAT SERPL: 13 (ref 12–20)
CALCIUM SERPL-MCNC: 8.9 MG/DL (ref 8.5–10.1)
CHLORIDE SERPL-SCNC: 105 MMOL/L (ref 97–108)
CHOLEST SERPL-MCNC: 171 MG/DL
CO2 SERPL-SCNC: 24 MMOL/L (ref 21–32)
CREAT SERPL-MCNC: 0.83 MG/DL (ref 0.55–1.02)
DIFFERENTIAL METHOD BLD: NORMAL
EOSINOPHIL # BLD: 0.1 K/UL (ref 0–0.4)
EOSINOPHIL NFR BLD: 1 % (ref 0–7)
ERYTHROCYTE [DISTWIDTH] IN BLOOD BY AUTOMATED COUNT: 13.1 % (ref 11.5–14.5)
EST. AVERAGE GLUCOSE BLD GHB EST-MCNC: 174 MG/DL
GLOBULIN SER CALC-MCNC: 3.3 G/DL (ref 2–4)
GLUCOSE SERPL-MCNC: 190 MG/DL (ref 65–100)
HBA1C MFR BLD: 7.7 % (ref 4–5.6)
HCT VFR BLD AUTO: 37.3 % (ref 35–47)
HCV AB SERPL QL IA: NONREACTIVE
HCV COMMENT,HCGAC: NORMAL
HDLC SERPL-MCNC: 56 MG/DL
HDLC SERPL: 3.1 {RATIO} (ref 0–5)
HGB BLD-MCNC: 12 G/DL (ref 11.5–16)
IMM GRANULOCYTES # BLD AUTO: 0 K/UL (ref 0–0.04)
IMM GRANULOCYTES NFR BLD AUTO: 0 % (ref 0–0.5)
LDLC SERPL CALC-MCNC: 107.8 MG/DL (ref 0–100)
LIPID PROFILE,FLP: ABNORMAL
LYMPHOCYTES # BLD: 1.5 K/UL (ref 0.8–3.5)
LYMPHOCYTES NFR BLD: 36 % (ref 12–49)
MCH RBC QN AUTO: 27.8 PG (ref 26–34)
MCHC RBC AUTO-ENTMCNC: 32.2 G/DL (ref 30–36.5)
MCV RBC AUTO: 86.3 FL (ref 80–99)
MONOCYTES # BLD: 0.2 K/UL (ref 0–1)
MONOCYTES NFR BLD: 5 % (ref 5–13)
NEUTS SEG # BLD: 2.3 K/UL (ref 1.8–8)
NEUTS SEG NFR BLD: 58 % (ref 32–75)
NRBC # BLD: 0 K/UL (ref 0–0.01)
NRBC BLD-RTO: 0 PER 100 WBC
PLATELET # BLD AUTO: 350 K/UL (ref 150–400)
PMV BLD AUTO: 10 FL (ref 8.9–12.9)
POTASSIUM SERPL-SCNC: 4.1 MMOL/L (ref 3.5–5.1)
PROT SERPL-MCNC: 7.4 G/DL (ref 6.4–8.2)
RBC # BLD AUTO: 4.32 M/UL (ref 3.8–5.2)
SODIUM SERPL-SCNC: 138 MMOL/L (ref 136–145)
TRIGL SERPL-MCNC: 36 MG/DL (ref ?–150)
VLDLC SERPL CALC-MCNC: 7.2 MG/DL
WBC # BLD AUTO: 4 K/UL (ref 3.6–11)

## 2021-04-26 PROCEDURE — 99213 OFFICE O/P EST LOW 20 MIN: CPT | Performed by: NURSE PRACTITIONER

## 2021-04-26 RX ORDER — INSULIN PUMP SYRINGE, 3 ML
EACH MISCELLANEOUS
Qty: 1 KIT | Refills: 0 | Status: SHIPPED | OUTPATIENT
Start: 2021-04-26

## 2021-04-26 NOTE — PROGRESS NOTES
Chief Complaint   Patient presents with    Diabetes     check up     3 most recent PHQ Screens 4/26/2021   PHQ Not Done -   Little interest or pleasure in doing things Not at all   Feeling down, depressed, irritable, or hopeless Not at all   Total Score PHQ 2 0   Trouble falling or staying asleep, or sleeping too much -   Feeling tired or having little energy -   Poor appetite, weight loss, or overeating -   Feeling bad about yourself - or that you are a failure or have let yourself or your family down -   Trouble concentrating on things such as school, work, reading, or watching TV -   Moving or speaking so slowly that other people could have noticed; or the opposite being so fidgety that others notice -   Thoughts of being better off dead, or hurting yourself in some way -   PHQ 9 Score -   How difficult have these problems made it for you to do your work, take care of your home and get along with others -     Learning Assessment 4/26/2021   PRIMARY LEARNER Patient   HIGHEST LEVEL OF EDUCATION - PRIMARY LEARNER  -   BARRIERS PRIMARY LEARNER -   CO-LEARNER CAREGIVER -   PRIMARY LANGUAGE ENGLISH   LEARNER PREFERENCE PRIMARY READING     LISTENING   ANSWERED BY patient   RELATIONSHIP SELF     Fall Risk Assessment, last 12 mths 4/26/2021   Able to walk? Yes   Fall in past 12 months? 0   Do you feel unsteady? 0   Are you worried about falling 0     Abuse Screening Questionnaire 4/26/2021   Do you ever feel afraid of your partner? N   Are you in a relationship with someone who physically or mentally threatens you? N   Is it safe for you to go home?  Y     ADL Assessment 4/26/2021   Feeding yourself No Help Needed   Getting from bed to chair No Help Needed   Getting dressed No Help Needed   Bathing or showering No Help Needed   Walk across the room (includes cane/walker) No Help Needed   Using the telphone No Help Needed   Taking your medications No Help Needed   Preparing meals No Help Needed   Managing money (expenses/bills) No Help Needed   Moderately strenuous housework (laundry) No Help Needed   Shopping for personal items (toiletries/medicines) No Help Needed   Shopping for groceries No Help Needed   Driving No Help Needed   Climbing a flight of stairs No Help Needed   Getting to places beyond walking distances No Help Needed     1. Have you been to the ER, urgent care clinic since your last visit? Hospitalized since your last visit? No    2. Have you seen or consulted any other health care providers outside of the [address] [place] [address] [place] since your last visit? Include any pap smears or colon screening.  No      Chief Complaint   Patient presents with    Diabetes     check up         Visit Vitals  /70 (BP 1 Location: Left arm, BP Patient Position: Sitting, BP Cuff Size: Adult long)   Pulse (!) 110   Temp 97.5 °F (36.4 °C) (Temporal)   Resp 20   Ht 5' 4\" (1.626 m)   Wt 165 lb 3.2 oz (74.9 kg)   SpO2 97%   BMI 28.36 kg/m²       Pain Scale: 0 - No pain/10  Pain Location:     Urbano Sanabria is a 54 y.o. female presenting for/with:    Diabetes (check up)      Symptom review:    NO  Fever   NO  Shaking chills  NO  Cough  NO  Body aches  NO  Coughing up blood  NO  Chest congestion  NO  Chest pain  NO  Shortness of breath  NO  Profound Loss of smell/taste  NO  Nausea/Vomiting   NO  Loose stool/Diarrhea  NO  any skin issues    Patient Risk Factors Reviewed as follows:  NO  have you been in Close contact with confirmed COVID19 patient   NO  History of recent travel to affected geographical areas within the past 14 days  NO  COPD  NO  Active Cancer/Leukemia/Lymphoma/Chemotherapy  NO  Oral steroid use  NO  Pregnant  NO  Diabetes Mellitus  NO  Heart disease  NO  Asthma  NO Health care worker at home  NO Health care worker  NO Is there a Pregnant Woman in the home  NO Dialysis pt in the home   NO a large number of people living in the home

## 2021-04-26 NOTE — TELEPHONE ENCOUNTER
----- Message from Estefany Montilla sent at 4/26/2021 12:56 PM EDT -----  Regarding: KEENA Dela Cruz/Telephone  General Message/Vendor Calls    Caller's first and last name: N/A      Reason for call: Pt would like to provide her vaccination dates      Callback required yes/no and why: yes, to provide her vaccination dates      Best contact number(s): [phone]      Details to clarify the request: Pt would like to provide her vaccination dates to Verna Benavidez

## 2021-04-26 NOTE — PROGRESS NOTES
Chief Complaint   Patient presents with    Diabetes     check up         HPI:       is a 54 y.o. female. [employer] employee in [place].      Diabetes: Metformin 500 mg (switched to 2 tabs in am s/t forgetting her nighttime doses).  No adverse effects.  Her BG has been running around 140, 170 at the highest.  Last A1C 7.5%     Iron deficiency: Doing well on iron.  Dose is spaced to 3 days per week.      New Issues:  She has been working evening shift. No Known Allergies    Current Outpatient Medications   Medication Sig    metFORMIN (GLUCOPHAGE) 1,000 mg tablet TAKE 1 TABLET BY MOUTH EVERY DAY WITH BREAKFAST    iron aspgly,qf-P-D73-FA-Ca-suc (FERREX 150 FORTE PLUS) 892-06-93-2 mg-mg-mcg-mg cap cap Take 1 Cap by mouth daily.  levonorgestrel (MIRENA) 20 mcg/24 hr (5 years) IUD 1 Each by IntraUTERine route once. No current facility-administered medications for this visit. Past Medical History:   Diagnosis Date    Anemia     Diabetes ([facility] [address]        No past surgical history on file. Social History     Socioeconomic History    Marital status: SINGLE     Spouse name: Not on file    Number of children: Not on file    Years of education: Not on file    Highest education level: Not on file   Tobacco Use    Smoking status: Never Smoker    Smokeless tobacco: Never Used   Substance and Sexual Activity    Alcohol use: Yes     Frequency: Monthly or less     Drinks per session: 1 or 2     Binge frequency: Never    Drug use: No    Sexual activity: Yes       Family History   Problem Relation Age of Onset    Diabetes Mother     Diabetes Father        Above history reviewed. ROS:  Denies fever, chills, cough, chest pain, SOB,  nausea, vomiting, or diarrhea. Denies wt loss, wt gain, hemoptysis, hematochezia or melena.     Physical Examination:    /70 (BP 1 Location: Left arm, BP Patient Position: Sitting, BP Cuff Size: Adult long)   Pulse (!) 110   Temp 97.5 °F (36.4 °C) (Temporal) Resp 20   Ht 5' 4\" (1.626 m)   Wt 165 lb 3.2 oz (74.9 kg)   SpO2 97%   BMI 28.36 kg/m²     General: Alert and Ox3, Fluent speech  HEENT:  PERRLA, EOM intact, TMs, turbinates, pharynx normal.  No thyromegaly. No cervical adenopathy. Neck:  Supple, no adenopathy, JVD, mass or bruit  Chest:  Clear to Ausculation, without wheezes, rales, rubs or ronchi  Cardiac: RRR  Extremities:  No cyanosis, clubbing or edema  Neurologic:  Ambulatory without assist, CN 2-12 grossly intact. Moves all extremities. Skin: no rash  Lymphadenopathy: no cervical or supraclavicular nodes    ASSESSMENT AND PLAN:     1. Type 2 diabetes mellitus without complication, without long-term current use of insulin ([facility] [address]  Checking labs  She is down 2 lbs   - HEMOGLOBIN A1C WITH EAG; Future  - METABOLIC PANEL, COMPREHENSIVE; Future  - LIPID PANEL; Future    2. Other iron deficiency anemia  Checking labs   - CBC WITH AUTOMATED DIFF; Future    3. Need for hepatitis C screening test  - HEPATITIS C AB;  Future     RTC in 3 months    Ariel Fonseca NP

## 2021-04-27 DIAGNOSIS — E11.9 TYPE 2 DIABETES MELLITUS WITHOUT COMPLICATION, WITHOUT LONG-TERM CURRENT USE OF INSULIN (HCC): Primary | ICD-10-CM

## 2021-04-27 NOTE — PROGRESS NOTES
Blood count normal.  cholesterol decent. Liver and kidneys are good. A1c up to 7.7%. Adding Hina Shutter. May help with weight some too.

## 2021-08-09 ENCOUNTER — OFFICE VISIT (OUTPATIENT)
Dept: FAMILY MEDICINE CLINIC | Age: 51
End: 2021-08-09
Payer: COMMERCIAL

## 2021-08-09 VITALS
SYSTOLIC BLOOD PRESSURE: 115 MMHG | WEIGHT: 165.8 LBS | TEMPERATURE: 98.7 F | HEART RATE: 104 BPM | DIASTOLIC BLOOD PRESSURE: 72 MMHG | HEIGHT: 64 IN | OXYGEN SATURATION: 96 % | RESPIRATION RATE: 17 BRPM | BODY MASS INDEX: 28.31 KG/M2

## 2021-08-09 DIAGNOSIS — H61.21 IMPACTED CERUMEN, RIGHT EAR: ICD-10-CM

## 2021-08-09 DIAGNOSIS — E11.9 TYPE 2 DIABETES MELLITUS WITHOUT COMPLICATION, WITHOUT LONG-TERM CURRENT USE OF INSULIN (HCC): Primary | ICD-10-CM

## 2021-08-09 DIAGNOSIS — E78.5 HYPERLIPIDEMIA, UNSPECIFIED HYPERLIPIDEMIA TYPE: ICD-10-CM

## 2021-08-09 DIAGNOSIS — Z12.11 SCREENING FOR MALIGNANT NEOPLASM OF COLON: ICD-10-CM

## 2021-08-09 PROCEDURE — 69209 REMOVE IMPACTED EAR WAX UNI: CPT | Performed by: NURSE PRACTITIONER

## 2021-08-09 PROCEDURE — 3051F HG A1C>EQUAL 7.0%<8.0%: CPT | Performed by: NURSE PRACTITIONER

## 2021-08-09 PROCEDURE — 99214 OFFICE O/P EST MOD 30 MIN: CPT | Performed by: NURSE PRACTITIONER

## 2021-08-09 NOTE — PROGRESS NOTES
Chief Complaint   Patient presents with    Diabetes     check up         HPI:       is a 54 y.o. female. [employer] employee in [place].      Diabetes: Metformin 1,000 mg in the morning.  No adverse effects.  Her BG has been running around 140, 170 at the highest.  Last A1C 7.7%     Iron deficiency: Doing well on iron.  Dose is spaced to 3 days per week.     New Issues:  She has not been taking her new [place]. She reports she has been better about her Metformin. Her dad is in the hospital which has been very stressful. No Known Allergies    Current Outpatient Medications   Medication Sig    dapagliflozin (FARXIGA) 5 mg tab tablet Take 1 Tab by mouth daily. Indications: type 2 diabetes mellitus    Blood-Glucose Meter monitoring kit Use to test glucose once per day. Dx: E11.9.    glucose blood VI test strips (ASCENSIA AUTODISC VI, ONE TOUCH ULTRA TEST VI) strip Use to test glucose once per day. Dx: E11.9.    metFORMIN (GLUCOPHAGE) 1,000 mg tablet TAKE 1 TABLET BY MOUTH EVERY DAY WITH BREAKFAST    iron aspgly,ja-Y-X16-FA-Ca-suc (FERREX 150 FORTE PLUS) 885-14-97-4 mg-mg-mcg-mg cap cap Take 1 Cap by mouth daily.  levonorgestrel (MIRENA) 20 mcg/24 hr (5 years) IUD 1 Each by IntraUTERine route once. No current facility-administered medications for this visit. Past Medical History:   Diagnosis Date    Anemia     Diabetes ([address]        No past surgical history on file. Social History     Socioeconomic History    Marital status: SINGLE     Spouse name: Not on file    Number of children: Not on file    Years of education: Not on file    Highest education level: Not on file   Tobacco Use    Smoking status: Never Smoker    Smokeless tobacco: Never Used   Substance and Sexual Activity    Alcohol use:  Yes    Drug use: No    Sexual activity: Yes     Social Determinants of Health     Financial Resource Strain:     Difficulty of Paying Living Expenses:    Food Insecurity:     Worried About Running Out of Food in the Last Year:     Dixie of Food in the Last Year:    Transportation Needs:     Lack of Transportation (Medical):  Lack of Transportation (Non-Medical):    Physical Activity:     Days of Exercise per Week:     Minutes of Exercise per Session:    Stress:     Feeling of Stress :    Social Connections:     Frequency of Communication with Friends and Family:     Frequency of Social Gatherings with Friends and Family:     Attends [employer]ism Services:     Active Member of Clubs or Organizations:     Attends Club or Organization Meetings:     Marital Status:        Family History   Problem Relation Age of Onset    Diabetes Mother     Diabetes Father        Above history reviewed. ROS:  Denies fever, chills, cough, chest pain, SOB,  nausea, vomiting, or diarrhea. Denies wt loss, wt gain, hemoptysis, hematochezia or melena. Physical Examination:    /72 (BP 1 Location: Left arm, BP Patient Position: Sitting, BP Cuff Size: Adult long)   Pulse (!) 104   Temp 98.7 °F (37.1 °C) (Temporal)   Resp 17   Ht 5' 4\" (1.626 m)   Wt 165 lb 12.8 oz (75.2 kg)   SpO2 96%   BMI 28.46 kg/m²     General: Alert and Ox3, Fluent speech  HEENT:  PERRLA, EOM intact, right ear canal with impacted cerumen prior to irrigation, TMs, turbinates, pharynx normal.  No thyromegaly. No cervical adenopathy. Neck:  Supple, no adenopathy, JVD, mass or bruit  Chest:  Clear to Ausculation, without wheezes, rales, rubs or ronchi  Cardiac: RRR  Abdomen:  +BS, soft, nontender without palpable HSM  Extremities:  No cyanosis, clubbing or edema  Neurologic:  Ambulatory without assist, CN 2-12 grossly intact. Moves all extremities. Skin: no rash  Lymphadenopathy: no cervical or supraclavicular nodes    ASSESSMENT AND PLAN:     1.  Type 2 diabetes mellitus without complication, without long-term current use of insulin ([address]  Patient only on Metformin currently  If A1c not less than 7, add back [place] - CBC WITH AUTOMATED DIFF; Future  - HEMOGLOBIN A1C WITH EAG; Future  - METABOLIC PANEL, BASIC; Future    2. Hyperlipidemia, unspecified hyperlipidemia type  - CBC WITH AUTOMATED DIFF; Future  - HEMOGLOBIN A1C WITH EAG; Future  - METABOLIC PANEL, BASIC; Future    3. Screening for malignant neoplasm of colon  - REFERRAL TO GENERAL SURGERY     4. Impacted cerumen, right ear  Ceruminosis is noted. Wax is removed by syringing and manual debridement. Instructions for home care to prevent wax buildup are given.    - REMOVAL IMPACTED CERUMEN IRRIGATION/LVG UNILAT     RTC in 3 months    Penelope Jensen, KEENA

## 2021-08-09 NOTE — PROGRESS NOTES
Chief Complaint   Patient presents with    Diabetes     check up       3 most recent PHQ Screens 8/9/2021   PHQ Not Done -   Little interest or pleasure in doing things Not at all   Feeling down, depressed, irritable, or hopeless Not at all   Total Score PHQ 2 0   Trouble falling or staying asleep, or sleeping too much -   Feeling tired or having little energy -   Poor appetite, weight loss, or overeating -   Feeling bad about yourself - or that you are a failure or have let yourself or your family down -   Trouble concentrating on things such as school, work, reading, or watching TV -   Moving or speaking so slowly that other people could have noticed; or the opposite being so fidgety that others notice -   Thoughts of being better off dead, or hurting yourself in some way -   PHQ 9 Score -   How difficult have these problems made it for you to do your work, take care of your home and get along with others -     Learning Assessment 8/9/2021   PRIMARY LEARNER Patient   HIGHEST LEVEL OF EDUCATION - PRIMARY LEARNER  -   BARRIERS PRIMARY LEARNER -   CO-LEARNER CAREGIVER -   PRIMARY LANGUAGE ENGLISH   LEARNER PREFERENCE PRIMARY READING     -   ANSWERED BY patient   RELATIONSHIP SELF     Fall Risk Assessment, last 12 mths 8/9/2021   Able to walk? Yes   Fall in past 12 months? 0   Do you feel unsteady? 0   Are you worried about falling 0     Abuse Screening Questionnaire 8/9/2021   Do you ever feel afraid of your partner? N   Are you in a relationship with someone who physically or mentally threatens you? N   Is it safe for you to go home?  Y     ADL Assessment 8/9/2021   Feeding yourself No Help Needed   Getting from bed to chair No Help Needed   Getting dressed No Help Needed   Bathing or showering No Help Needed   Walk across the room (includes cane/walker) No Help Needed   Using the telphone No Help Needed   Taking your medications No Help Needed   Preparing meals No Help Needed   Managing money (expenses/bills) No Help Needed   Moderately strenuous housework (laundry) No Help Needed   Shopping for personal items (toiletries/medicines) No Help Needed   Shopping for groceries No Help Needed   Driving No Help Needed   Climbing a flight of stairs No Help Needed   Getting to places beyond walking distances No Help Needed     1. Have you been to the ER, urgent care clinic since your last visit? Hospitalized since your last visit? No    2. Have you seen or consulted any other health care providers outside of the [facility] [place] [address] [facility] since your last visit? Include any pap smears or colon screening. No      Chief Complaint   Patient presents with    Diabetes     check up         Visit Vitals  /72 (BP 1 Location: Left arm, BP Patient Position: Sitting, BP Cuff Size: Adult long)   Pulse (!) 104   Temp 98.7 °F (37.1 °C) (Temporal)   Resp 17   Ht 5' 4\" (1.626 m)   Wt 165 lb 12.8 oz (75.2 kg)   SpO2 96%   BMI 28.46 kg/m²       Pain Scale: 0 - No pain/10  Pain Location:     Adrianne Xiao is a 54 y.o. female presenting for/with:    No chief complaint on file.       Symptom review:    NO  Fever   NO  Shaking chills  NO  Cough  NO  Body aches  NO  Coughing up blood  NO  Chest congestion  NO  Chest pain  NO  Shortness of breath  NO  Profound Loss of smell/taste  NO  Nausea/Vomiting   NO  Loose stool/Diarrhea  NO  any skin issues    Patient Risk Factors Reviewed as follows:  NO  have you been in Close contact with confirmed COVID19 patient   NO  History of recent travel to affected geographical areas within the past 14 days  NO  COPD  NO  Active Cancer/Leukemia/Lymphoma/Chemotherapy  NO  Oral steroid use  NO  Pregnant  NO  Diabetes Mellitus  NO  Heart disease  NO  Asthma  NO Health care worker at home  NO Health care worker  NO Is there a Pregnant Woman in the home  NO Dialysis pt in the home   NO a large number of people living in the home  Recent Travel Screening and Travel History documentation     Travel Screening     Question Response    In the last month, have you been in contact with someone who was confirmed or suspected to have Coronavirus / COVID-19? No / Unsure    Have you had a COVID-19 viral test in the last 14 days? No    Do you have any of the following new or worsening symptoms? None of these    Have you traveled internationally or domestically in the last month?   No      Travel History   Travel since 07/09/21     No documented travel since 07/09/21

## 2021-08-10 LAB
ANION GAP SERPL CALC-SCNC: 7 MMOL/L (ref 5–15)
BASOPHILS # BLD: 0 K/UL (ref 0–0.1)
BASOPHILS NFR BLD: 0 % (ref 0–1)
BUN SERPL-MCNC: 10 MG/DL (ref 6–20)
BUN/CREAT SERPL: 13 (ref 12–20)
CALCIUM SERPL-MCNC: 8.4 MG/DL (ref 8.5–10.1)
CHLORIDE SERPL-SCNC: 106 MMOL/L (ref 97–108)
CO2 SERPL-SCNC: 26 MMOL/L (ref 21–32)
CREAT SERPL-MCNC: 0.77 MG/DL (ref 0.55–1.02)
DIFFERENTIAL METHOD BLD: ABNORMAL
EOSINOPHIL # BLD: 0 K/UL (ref 0–0.4)
EOSINOPHIL NFR BLD: 1 % (ref 0–7)
ERYTHROCYTE [DISTWIDTH] IN BLOOD BY AUTOMATED COUNT: 13.2 % (ref 11.5–14.5)
EST. AVERAGE GLUCOSE BLD GHB EST-MCNC: 186 MG/DL
GLUCOSE SERPL-MCNC: 166 MG/DL (ref 65–100)
HBA1C MFR BLD: 8.1 % (ref 4–5.6)
HCT VFR BLD AUTO: 35.1 % (ref 35–47)
HGB BLD-MCNC: 11.3 G/DL (ref 11.5–16)
IMM GRANULOCYTES # BLD AUTO: 0 K/UL (ref 0–0.04)
IMM GRANULOCYTES NFR BLD AUTO: 0 % (ref 0–0.5)
LYMPHOCYTES # BLD: 1.7 K/UL (ref 0.8–3.5)
LYMPHOCYTES NFR BLD: 39 % (ref 12–49)
MCH RBC QN AUTO: 28 PG (ref 26–34)
MCHC RBC AUTO-ENTMCNC: 32.2 G/DL (ref 30–36.5)
MCV RBC AUTO: 87.1 FL (ref 80–99)
MONOCYTES # BLD: 0.3 K/UL (ref 0–1)
MONOCYTES NFR BLD: 7 % (ref 5–13)
NEUTS SEG # BLD: 2.3 K/UL (ref 1.8–8)
NEUTS SEG NFR BLD: 53 % (ref 32–75)
NRBC # BLD: 0 K/UL (ref 0–0.01)
NRBC BLD-RTO: 0 PER 100 WBC
PLATELET # BLD AUTO: 303 K/UL (ref 150–400)
PMV BLD AUTO: 10.3 FL (ref 8.9–12.9)
POTASSIUM SERPL-SCNC: 3.7 MMOL/L (ref 3.5–5.1)
RBC # BLD AUTO: 4.03 M/UL (ref 3.8–5.2)
SODIUM SERPL-SCNC: 139 MMOL/L (ref 136–145)
WBC # BLD AUTO: 4.3 K/UL (ref 3.6–11)

## 2021-08-10 NOTE — PROGRESS NOTES
Kidneys are good. A1c is worse at 8.1%. this increases risk of heart attack and kidney disease. Please start [place] as previously directed.

## 2021-11-29 ENCOUNTER — OFFICE VISIT (OUTPATIENT)
Dept: FAMILY MEDICINE CLINIC | Age: 51
End: 2021-11-29
Payer: COMMERCIAL

## 2021-11-29 VITALS
SYSTOLIC BLOOD PRESSURE: 118 MMHG | WEIGHT: 160.6 LBS | OXYGEN SATURATION: 99 % | RESPIRATION RATE: 17 BRPM | DIASTOLIC BLOOD PRESSURE: 60 MMHG | TEMPERATURE: 98.5 F | BODY MASS INDEX: 27.42 KG/M2 | HEART RATE: 94 BPM | HEIGHT: 64 IN

## 2021-11-29 DIAGNOSIS — E11.9 TYPE 2 DIABETES MELLITUS WITHOUT COMPLICATION, WITHOUT LONG-TERM CURRENT USE OF INSULIN (HCC): Primary | ICD-10-CM

## 2021-11-29 DIAGNOSIS — Z23 ENCOUNTER FOR IMMUNIZATION: ICD-10-CM

## 2021-11-29 DIAGNOSIS — D50.8 OTHER IRON DEFICIENCY ANEMIA: ICD-10-CM

## 2021-11-29 PROCEDURE — 90686 IIV4 VACC NO PRSV 0.5 ML IM: CPT | Performed by: NURSE PRACTITIONER

## 2021-11-29 PROCEDURE — 3052F HG A1C>EQUAL 8.0%<EQUAL 9.0%: CPT | Performed by: NURSE PRACTITIONER

## 2021-11-29 PROCEDURE — 99213 OFFICE O/P EST LOW 20 MIN: CPT | Performed by: NURSE PRACTITIONER

## 2021-11-29 PROCEDURE — 90471 IMMUNIZATION ADMIN: CPT | Performed by: NURSE PRACTITIONER

## 2021-11-29 NOTE — PROGRESS NOTES
Chief Complaint   Patient presents with    Diabetes     a1c routine check         HPI:       is a 54 y.o. female. [employer] employee in [place].      Diabetes: Metformin 1,000 mg and Farxiga. No adverse effects.  Her BG has been running around 140, 170 at the highest.  Last A1C 8.1%     Iron deficiency: Doing well on iron.  Dose is spaced to 3 days per week.     New Issues:  She is here for a check-up. Appointment with Dr. Suzanne Ahuja coming up 12/6/21. No Known Allergies    Current Outpatient Medications   Medication Sig    dapagliflozin (FARXIGA) 5 mg tab tablet Take 1 Tab by mouth daily. Indications: type 2 diabetes mellitus    Blood-Glucose Meter monitoring kit Use to test glucose once per day. Dx: E11.9.    glucose blood VI test strips (ASCENSIA AUTODISC VI, ONE TOUCH ULTRA TEST VI) strip Use to test glucose once per day. Dx: E11.9.    metFORMIN (GLUCOPHAGE) 1,000 mg tablet TAKE 1 TABLET BY MOUTH EVERY DAY WITH BREAKFAST    iron aspgly,oy-G-W24-FA-Ca-suc (FERREX 150 FORTE PLUS) 440-46-69-8 mg-mg-mcg-mg cap cap Take 1 Cap by mouth daily.  levonorgestrel (MIRENA) 20 mcg/24 hr (5 years) IUD 1 Each by IntraUTERine route once. No current facility-administered medications for this visit. Past Medical History:   Diagnosis Date    Anemia     Diabetes ([address]        No past surgical history on file. Social History     Socioeconomic History    Marital status: SINGLE   Tobacco Use    Smoking status: Never Smoker    Smokeless tobacco: Never Used   Substance and Sexual Activity    Alcohol use: Yes    Drug use: No    Sexual activity: Yes       Family History   Problem Relation Age of Onset    Diabetes Mother     Diabetes Father        Above history reviewed. ROS:  Denies fever, chills, cough, chest pain, SOB,  nausea, vomiting, or diarrhea. Denies wt loss, wt gain, hemoptysis, hematochezia or melena.     Physical Examination:    /60 (BP 1 Location: Right arm, BP Patient Position: Sitting, BP Cuff Size: Adult long)   Pulse 94   Temp 98.5 °F (36.9 °C) (Temporal)   Resp 17   Ht 5' 4\" (1.626 m)   Wt 160 lb 9.6 oz (72.8 kg)   SpO2 99%   BMI 27.57 kg/m²     General: Alert and Ox3, Fluent speech  HEENT:  PERRLA, EOM intact, TMs, turbinates, pharynx normal.  No thyromegaly. No cervical adenopathy. Neck:  Supple, no adenopathy, JVD, mass or bruit  Chest:  Clear to Ausculation, without wheezes, rales, rubs or ronchi  Cardiac: RRR  Abdomen:  +BS, soft, nontender without palpable HSM  Extremities:  No cyanosis, clubbing or edema  Neurologic:  Ambulatory without assist, CN 2-12 grossly intact. Moves all extremities. Skin: no rash  Lymphadenopathy: no cervical or supraclavicular nodes    Diabetic foot exam:     Left Foot:   Visual Exam: normal    Pulse DP: 2+ (normal)   Filament test: normal sensation    Vibratory sensation: normal      Right Foot:   Visual Exam: normal    Pulse DP: 2+ (normal)   Filament test: normal sensation    Vibratory sensation: normal     ASSESSMENT AND PLAN:     1. Encounter for immunization  - INFLUENZA VIRUS VAC QUAD,SPLIT,PRESV FREE SYRINGE IM  - MI IMMUNIZ ADMIN,1 SINGLE/COMB VAC/TOXOID    2. Type 2 diabetes mellitus without complication, without long-term current use of insulin (HCC)  Good control  Continue current regimen   -  DIABETES FOOT EXAM  - MICROALBUMIN, UR, RAND W/ MICROALB/CREAT RATIO; Future  - METABOLIC PANEL, COMPREHENSIVE; Future  - HEMOGLOBIN A1C WITH EAG; Future  - CBC WITH AUTOMATED DIFF; Future  - LIPID PANEL; Future  - LIPID PANEL  - CBC WITH AUTOMATED DIFF  - HEMOGLOBIN A1C WITH EAG  - METABOLIC PANEL, COMPREHENSIVE  - MICROALBUMIN, UR, RAND W/ MICROALB/CREAT RATIO    3. Other iron deficiency anemia  - CBC WITH AUTOMATED DIFF;  Future  - CBC WITH AUTOMATED DIFF     RTC in 3 months     Rahel Munroe NP

## 2021-11-29 NOTE — PROGRESS NOTES
Chief Complaint   Patient presents with    Diabetes     a1c routine check     3 most recent PHQ Screens 11/29/2021   PHQ Not Done -   Little interest or pleasure in doing things Not at all   Feeling down, depressed, irritable, or hopeless Not at all   Total Score PHQ 2 0   Trouble falling or staying asleep, or sleeping too much -   Feeling tired or having little energy -   Poor appetite, weight loss, or overeating -   Feeling bad about yourself - or that you are a failure or have let yourself or your family down -   Trouble concentrating on things such as school, work, reading, or watching TV -   Moving or speaking so slowly that other people could have noticed; or the opposite being so fidgety that others notice -   Thoughts of being better off dead, or hurting yourself in some way -   PHQ 9 Score -   How difficult have these problems made it for you to do your work, take care of your home and get along with others -     Learning Assessment 11/29/2021   PRIMARY LEARNER Patient   HIGHEST LEVEL OF EDUCATION - PRIMARY LEARNER  -   BARRIERS PRIMARY LEARNER -   CO-LEARNER CAREGIVER -   PRIMARY LANGUAGE ENGLISH   LEARNER PREFERENCE PRIMARY READING     -   ANSWERED BY patient   RELATIONSHIP SELF     Fall Risk Assessment, last 12 mths 11/29/2021   Able to walk? Yes   Fall in past 12 months? 0   Do you feel unsteady? 0   Are you worried about falling 0     Abuse Screening Questionnaire 11/29/2021   Do you ever feel afraid of your partner? N   Are you in a relationship with someone who physically or mentally threatens you? N   Is it safe for you to go home?  Y     ADL Assessment 11/29/2021   Feeding yourself No Help Needed   Getting from bed to chair No Help Needed   Getting dressed No Help Needed   Bathing or showering No Help Needed   Walk across the room (includes cane/walker) No Help Needed   Using the telphone No Help Needed   Taking your medications No Help Needed   Preparing meals No Help Needed   Managing money (expenses/bills) No Help Needed   Moderately strenuous housework (laundry) No Help Needed   Shopping for personal items (toiletries/medicines) No Help Needed   Shopping for groceries No Help Needed   Driving No Help Needed   Climbing a flight of stairs No Help Needed   Getting to places beyond walking distances No Help Needed     1. Have you been to the ER, urgent care clinic since your last visit? Hospitalized since your last visit? No    2. Have you seen or consulted any other health care providers outside of the [address] [place] [address] [facility] since your last visit? Include any pap smears or colon screening.  No      Chief Complaint   Patient presents with    Diabetes     a1c routine check         Visit Vitals  /60 (BP 1 Location: Right arm, BP Patient Position: Sitting, BP Cuff Size: Adult long)   Pulse 94   Temp 98.5 °F (36.9 °C) (Temporal)   Resp 17   Ht 5' 4\" (1.626 m)   Wt 160 lb 9.6 oz (72.8 kg)   SpO2 99%   BMI 27.57 kg/m²       Pain Scale: 0 - No pain/10  Pain Location:     Bebo Ford is a 54 y.o. female presenting for/with:    Diabetes (a1c routine check)      Symptom review:    NO  Fever   NO  Shaking chills  NO  Cough  NO  Body aches  NO  Coughing up blood  NO  Chest congestion  NO  Chest pain  NO  Shortness of breath  NO  Profound Loss of smell/taste  NO  Nausea/Vomiting   NO  Loose stool/Diarrhea  NO  any skin issues    Patient Risk Factors Reviewed as follows:  NO  have you been in Close contact with confirmed COVID19 patient   NO  History of recent travel to affected geographical areas within the past 14 days  NO  COPD  NO  Active Cancer/Leukemia/Lymphoma/Chemotherapy  NO  Oral steroid use  NO  Pregnant  NO  Diabetes Mellitus  NO  Heart disease  NO  Asthma  NO Health care worker at home  NO Health care worker  NO Is there a Pregnant Woman in the home  NO Dialysis pt in the home   NO a large number of people living in the home  Recent Travel Screening and Travel History documentation     Travel Screening     Question   Response    In the last month, have you been in contact with someone who was confirmed or suspected to have Coronavirus / COVID-19? No / Unsure    Have you had a COVID-19 viral test in the last 14 days? No    Do you have any of the following new or worsening symptoms? None of these    Have you traveled internationally or domestically in the last month? No      Travel History   Travel since 10/29/21    No documented travel since 10/29/21             After obtaining consent, and per orders of FARHAN Dela Cruz , injection of Flulaval (left deltoid) given by Angela Green. Patient instructed to remain in clinic for 20 minutes afterwards, and to report any adverse reaction to me immediately.

## 2021-11-30 LAB
ALBUMIN SERPL-MCNC: 3.8 G/DL (ref 3.5–5)
ALBUMIN/GLOB SERPL: 1.3 {RATIO} (ref 1.1–2.2)
ALP SERPL-CCNC: 78 U/L (ref 45–117)
ALT SERPL-CCNC: 20 U/L (ref 12–78)
ANION GAP SERPL CALC-SCNC: 5 MMOL/L (ref 5–15)
AST SERPL-CCNC: 15 U/L (ref 15–37)
BASOPHILS # BLD: 0 K/UL (ref 0–0.1)
BASOPHILS NFR BLD: 0 % (ref 0–1)
BILIRUB SERPL-MCNC: 0.5 MG/DL (ref 0.2–1)
BUN SERPL-MCNC: 10 MG/DL (ref 6–20)
BUN/CREAT SERPL: 14 (ref 12–20)
CALCIUM SERPL-MCNC: 8.6 MG/DL (ref 8.5–10.1)
CHLORIDE SERPL-SCNC: 107 MMOL/L (ref 97–108)
CHOLEST SERPL-MCNC: 160 MG/DL
CO2 SERPL-SCNC: 26 MMOL/L (ref 21–32)
CREAT SERPL-MCNC: 0.73 MG/DL (ref 0.55–1.02)
CREAT UR-MCNC: 223 MG/DL
DIFFERENTIAL METHOD BLD: NORMAL
EOSINOPHIL # BLD: 0 K/UL (ref 0–0.4)
EOSINOPHIL NFR BLD: 0 % (ref 0–7)
ERYTHROCYTE [DISTWIDTH] IN BLOOD BY AUTOMATED COUNT: 13.1 % (ref 11.5–14.5)
EST. AVERAGE GLUCOSE BLD GHB EST-MCNC: 160 MG/DL
GLOBULIN SER CALC-MCNC: 3 G/DL (ref 2–4)
GLUCOSE SERPL-MCNC: 126 MG/DL (ref 65–100)
HBA1C MFR BLD: 7.2 % (ref 4–5.6)
HCT VFR BLD AUTO: 38.1 % (ref 35–47)
HDLC SERPL-MCNC: 59 MG/DL
HDLC SERPL: 2.7 {RATIO} (ref 0–5)
HGB BLD-MCNC: 12 G/DL (ref 11.5–16)
IMM GRANULOCYTES # BLD AUTO: 0 K/UL (ref 0–0.04)
IMM GRANULOCYTES NFR BLD AUTO: 0 % (ref 0–0.5)
LDLC SERPL CALC-MCNC: 93.8 MG/DL (ref 0–100)
LYMPHOCYTES # BLD: 1.7 K/UL (ref 0.8–3.5)
LYMPHOCYTES NFR BLD: 38 % (ref 12–49)
MCH RBC QN AUTO: 28 PG (ref 26–34)
MCHC RBC AUTO-ENTMCNC: 31.5 G/DL (ref 30–36.5)
MCV RBC AUTO: 88.8 FL (ref 80–99)
MICROALBUMIN UR-MCNC: 2.13 MG/DL
MICROALBUMIN/CREAT UR-RTO: 10 MG/G (ref 0–30)
MONOCYTES # BLD: 0.3 K/UL (ref 0–1)
MONOCYTES NFR BLD: 7 % (ref 5–13)
NEUTS SEG # BLD: 2.5 K/UL (ref 1.8–8)
NEUTS SEG NFR BLD: 55 % (ref 32–75)
NRBC # BLD: 0 K/UL (ref 0–0.01)
NRBC BLD-RTO: 0 PER 100 WBC
PLATELET # BLD AUTO: 320 K/UL (ref 150–400)
PMV BLD AUTO: 10.3 FL (ref 8.9–12.9)
POTASSIUM SERPL-SCNC: 3.8 MMOL/L (ref 3.5–5.1)
PROT SERPL-MCNC: 6.8 G/DL (ref 6.4–8.2)
RBC # BLD AUTO: 4.29 M/UL (ref 3.8–5.2)
SODIUM SERPL-SCNC: 138 MMOL/L (ref 136–145)
TRIGL SERPL-MCNC: 36 MG/DL (ref ?–150)
VLDLC SERPL CALC-MCNC: 7.2 MG/DL
WBC # BLD AUTO: 4.5 K/UL (ref 3.6–11)

## 2021-11-30 NOTE — PROGRESS NOTES
Cholesterol is improved. Hemoglobin normal. A1c much better at 7.2.  continue current dosages. Liver and kidneys are good. Urine test good.

## 2022-01-17 ENCOUNTER — OFFICE VISIT (OUTPATIENT)
Dept: FAMILY MEDICINE CLINIC | Age: 52
End: 2022-01-17
Payer: COMMERCIAL

## 2022-01-17 VITALS
DIASTOLIC BLOOD PRESSURE: 60 MMHG | HEART RATE: 108 BPM | WEIGHT: 142.6 LBS | RESPIRATION RATE: 18 BRPM | TEMPERATURE: 98.6 F | SYSTOLIC BLOOD PRESSURE: 120 MMHG | OXYGEN SATURATION: 98 % | HEIGHT: 64 IN | BODY MASS INDEX: 24.34 KG/M2

## 2022-01-17 DIAGNOSIS — F41.9 ANXIETY: Primary | ICD-10-CM

## 2022-01-17 DIAGNOSIS — F43.21 GRIEF REACTION: ICD-10-CM

## 2022-01-17 PROCEDURE — 99214 OFFICE O/P EST MOD 30 MIN: CPT | Performed by: NURSE PRACTITIONER

## 2022-01-17 RX ORDER — BUPROPION HYDROCHLORIDE 150 MG/1
150 TABLET ORAL DAILY
Qty: 30 TABLET | Refills: 1 | Status: SHIPPED | OUTPATIENT
Start: 2022-01-17 | End: 2022-01-31

## 2022-01-17 RX ORDER — ALPRAZOLAM 0.25 MG/1
0.25 TABLET ORAL
Qty: 10 TABLET | Refills: 0 | Status: SHIPPED | OUTPATIENT
Start: 2022-01-17 | End: 2022-07-05 | Stop reason: SDDI

## 2022-01-17 NOTE — PROGRESS NOTES
Chief Complaint   Patient presents with    Anxiety     having frequent panic attacks, worsening when she tries to go to work. Interested in starting a medication but not interested in counseling/therapy at this time.  Weight Loss     loss of appetite. Down 18lbs since last visit on 11/29/2021         HPI:      Prosper Flores is a 50 y.o. female. [employer] employee in [place].      Diabetes: Metformin 1,000 mg and Farxiga. No adverse effects.  Her BG has been running around 140, 170 at the highest.  Last A1C 7.2%     Iron deficiency: Doing well on iron.  Dose is spaced to 3 days per week.     New Issues:  Nhi Gonzales has been having a lot of anxiety. This has been worse since her last visit 11/29/21. She reports that she had side effects to the flu vaccine and she was told to stay home from the office since some of her symptoms were concerning for COVID. She had a hard time at home. Her parents both passed in the past 2 years. She lives in their family home. Her friend/cousin who came with her today thinks that Nhi Gonzales hasn't given herself a chance to grieve. She has had feelings of panic that are worse when she is getting ready to go to work. She worries about her job performance and about keeping up. She finds herself distracted at work thinking about the death of her parents. She has lost weight unintentionally. No Known Allergies    Current Outpatient Medications   Medication Sig    buPROPion XL (WELLBUTRIN XL) 150 mg tablet Take 1 Tablet by mouth daily. Indications: Anxiety and lack of focus    ALPRAZolam (XANAX) 0.25 mg tablet Take 1 Tablet by mouth two (2) times daily as needed for Anxiety (Panic). Max Daily Amount: 0.5 mg.    dapagliflozin (FARXIGA) 5 mg tab tablet Take 1 Tab by mouth daily. Indications: type 2 diabetes mellitus    Blood-Glucose Meter monitoring kit Use to test glucose once per day.   Dx: E11.9.    glucose blood VI test strips (ASCENSIA AUTODISC VI, ONE TOUCH ULTRA TEST VI) strip Use to test glucose once per day. Dx: E11.9.    metFORMIN (GLUCOPHAGE) 1,000 mg tablet TAKE 1 TABLET BY MOUTH EVERY DAY WITH BREAKFAST    iron aspgly,re-K-D05-FA-Ca-suc (FERREX 150 FORTE PLUS) 899-89-61-2 mg-mg-mcg-mg cap cap Take 1 Cap by mouth daily.  levonorgestrel (MIRENA) 20 mcg/24 hr (5 years) IUD 1 Each by IntraUTERine route once. No current facility-administered medications for this visit. Past Medical History:   Diagnosis Date    Anemia     Diabetes ([address]        No past surgical history on file. Social History     Socioeconomic History    Marital status: SINGLE   Tobacco Use    Smoking status: Never Smoker    Smokeless tobacco: Never Used   Substance and Sexual Activity    Alcohol use: Yes    Drug use: No    Sexual activity: Yes       Family History   Problem Relation Age of Onset    Diabetes Mother     Diabetes Father        Above history reviewed. ROS:  Denies fever, chills, cough, chest pain, SOB,  nausea, vomiting, or diarrhea. Denies wt loss, wt gain, hemoptysis, hematochezia or melena. Physical Examination:    /60 (BP 1 Location: Right arm, BP Patient Position: Sitting, BP Cuff Size: Adult long)   Pulse (!) 108   Temp 98.6 °F (37 °C) (Temporal)   Resp 18   Ht 5' 4\" (1.626 m)   Wt 142 lb 9.6 oz (64.7 kg)   SpO2 98%   BMI 24.48 kg/m²     General: Alert and Ox3, Fluent speech, tearful with poor eye contact at times   Neck:  Supple, no adenopathy, JVD, mass or bruit  Chest:  Clear to Ausculation, without wheezes, rales, rubs or ronchi  Cardiac: RRR  Extremities:  No cyanosis, clubbing or edema  Neurologic:  Ambulatory without assist, CN 2-12 grossly intact. Moves all extremities. Skin: no rash  Lymphadenopathy: no cervical or supraclavicular nodes    ASSESSMENT AND PLAN:     1. Anxiety  Symptoms are effecting her day to day functioning at this point  Starting on Wellbutrin  Very small fill of Xanax given.   Educated on risk of abuse and addiction  - buPROPion XL (WELLBUTRIN XL) 150 mg tablet; Take 1 Tablet by mouth daily. Indications: Anxiety and lack of focus  Dispense: 30 Tablet; Refill: 1  - ALPRAZolam (XANAX) 0.25 mg tablet; Take 1 Tablet by mouth two (2) times daily as needed for Anxiety (Panic). Max Daily Amount: 0.5 mg.  Dispense: 10 Tablet; Refill: 0    2.  Grief reaction  Discussed potential for talk therapy/counseling   She will think about this option     RTC Friday for close f/up    Michelle Negro NP

## 2022-01-17 NOTE — PROGRESS NOTES
Chief Complaint   Patient presents with    Anxiety     having frequent panic attacks, worsening when she tries to go to work. Interested in starting a medication but not interested in counseling/therapy at this time.  Weight Loss     loss of appetite. Down 18lbs since last visit on 11/29/2021       3 most recent PHQ Screens 1/17/2022   PHQ Not Done -   Little interest or pleasure in doing things Not at all   Feeling down, depressed, irritable, or hopeless Not at all   Total Score PHQ 2 0   Trouble falling or staying asleep, or sleeping too much -   Feeling tired or having little energy -   Poor appetite, weight loss, or overeating -   Feeling bad about yourself - or that you are a failure or have let yourself or your family down -   Trouble concentrating on things such as school, work, reading, or watching TV -   Moving or speaking so slowly that other people could have noticed; or the opposite being so fidgety that others notice -   Thoughts of being better off dead, or hurting yourself in some way -   PHQ 9 Score -   How difficult have these problems made it for you to do your work, take care of your home and get along with others -     Learning Assessment 1/17/2022   PRIMARY LEARNER Patient   HIGHEST LEVEL OF EDUCATION - PRIMARY LEARNER  -   BARRIERS PRIMARY LEARNER -   CO-LEARNER CAREGIVER -   PRIMARY LANGUAGE ENGLISH   LEARNER PREFERENCE PRIMARY READING     -   ANSWERED BY patient   RELATIONSHIP SELF     Fall Risk Assessment, last 12 mths 1/17/2022   Able to walk? Yes   Fall in past 12 months? 0   Do you feel unsteady? 0   Are you worried about falling 0     Abuse Screening Questionnaire 1/17/2022   Do you ever feel afraid of your partner? N   Are you in a relationship with someone who physically or mentally threatens you? N   Is it safe for you to go home?  Y     ADL Assessment 1/17/2022   Feeding yourself No Help Needed   Getting from bed to chair No Help Needed   Getting dressed No Help Needed   Bathing or showering No Help Needed   Walk across the room (includes cane/walker) No Help Needed   Using the telphone No Help Needed   Taking your medications No Help Needed   Preparing meals No Help Needed   Managing money (expenses/bills) No Help Needed   Moderately strenuous housework (laundry) No Help Needed   Shopping for personal items (toiletries/medicines) No Help Needed   Shopping for groceries No Help Needed   Driving No Help Needed   Climbing a flight of stairs No Help Needed   Getting to places beyond walking distances No Help Needed     1. Have you been to the ER, urgent care clinic since your last visit? Hospitalized since your last visit? No    2. Have you seen or consulted any other health care providers outside of the [facility] [place] [address] [place] since your last visit? Include any pap smears or colon screening. No      Chief Complaint   Patient presents with    Anxiety     having frequent panic attacks, worsening when she tries to go to work. Interested in starting a medication but not interested in counseling/therapy at this time.  Weight Loss     loss of appetite. Down 18lbs since last visit on 11/29/2021         Visit Vitals  /60 (BP 1 Location: Right arm, BP Patient Position: Sitting, BP Cuff Size: Adult long)   Pulse (!) 108   Temp 98.6 °F (37 °C) (Temporal)   Resp 18   Ht 5' 4\" (1.626 m)   Wt 142 lb 9.6 oz (64.7 kg)   SpO2 98%   BMI 24.48 kg/m²       Pain Scale: 0 - No pain/10  Pain Location:     Nhi Woodson is a 50 y.o. female presenting for/with:    Anxiety (having frequent panic attacks, worsening when she tries to go to work. ) and Weight Loss (loss of appetite.  Down 18lbs since last visit on 11/29/2021)      Symptom review:    NO  Fever   NO  Shaking chills  NO  Cough  NO  Body aches  NO  Coughing up blood  NO  Chest congestion  NO  Chest pain  NO  Shortness of breath  NO  Profound Loss of smell/taste  NO  Nausea/Vomiting   NO  Loose stool/Diarrhea  NO  any skin issues    Patient Risk Factors Reviewed as follows:  NO  have you been in Close contact with confirmed COVID19 patient   NO  History of recent travel to affected geographical areas within the past 14 days  NO  COPD  NO  Active Cancer/Leukemia/Lymphoma/Chemotherapy  NO  Oral steroid use  NO  Pregnant  NO  Diabetes Mellitus  NO  Heart disease  NO  Asthma  NO Health care worker at home  [address] care worker  NO Is there a Pregnant Woman in the home  NO Dialysis pt in the home   NO a large number of people living in the home    Recent Travel Screening and Travel History documentation     Travel Screening     Question   Response    In the last month, have you been in contact with someone who was confirmed or suspected to have Coronavirus / COVID-19? No / Unsure    Have you had a COVID-19 viral test in the last 14 days? No    Do you have any of the following new or worsening symptoms? None of these    Have you traveled internationally or domestically in the last month?   No      Travel History   Travel since 12/17/21    No documented travel since 12/17/21

## 2022-01-21 ENCOUNTER — OFFICE VISIT (OUTPATIENT)
Dept: FAMILY MEDICINE CLINIC | Age: 52
End: 2022-01-21
Payer: COMMERCIAL

## 2022-01-21 VITALS
HEART RATE: 114 BPM | SYSTOLIC BLOOD PRESSURE: 110 MMHG | TEMPERATURE: 97.5 F | WEIGHT: 142.2 LBS | BODY MASS INDEX: 24.28 KG/M2 | OXYGEN SATURATION: 97 % | DIASTOLIC BLOOD PRESSURE: 62 MMHG | HEIGHT: 64 IN | RESPIRATION RATE: 17 BRPM

## 2022-01-21 DIAGNOSIS — F43.21 GRIEF REACTION: ICD-10-CM

## 2022-01-21 DIAGNOSIS — F41.9 ANXIETY: Primary | ICD-10-CM

## 2022-01-21 PROCEDURE — 99213 OFFICE O/P EST LOW 20 MIN: CPT | Performed by: NURSE PRACTITIONER

## 2022-01-21 NOTE — PROGRESS NOTES
Chief Complaint   Patient presents with    Medication Evaluation     Bupropion and Xanax f/u    Anxiety     3 most recent PHQ Screens 1/21/2022   PHQ Not Done -   Little interest or pleasure in doing things Not at all   Feeling down, depressed, irritable, or hopeless Not at all   Total Score PHQ 2 0   Trouble falling or staying asleep, or sleeping too much -   Feeling tired or having little energy -   Poor appetite, weight loss, or overeating -   Feeling bad about yourself - or that you are a failure or have let yourself or your family down -   Trouble concentrating on things such as school, work, reading, or watching TV -   Moving or speaking so slowly that other people could have noticed; or the opposite being so fidgety that others notice -   Thoughts of being better off dead, or hurting yourself in some way -   PHQ 9 Score -   How difficult have these problems made it for you to do your work, take care of your home and get along with others -     Learning Assessment 1/21/2022   PRIMARY LEARNER Patient   HIGHEST LEVEL OF EDUCATION - PRIMARY LEARNER  -   BARRIERS PRIMARY LEARNER -   CO-LEARNER CAREGIVER -   PRIMARY LANGUAGE ENGLISH   LEARNER PREFERENCE PRIMARY READING     -   ANSWERED BY patient   RELATIONSHIP SELF     Fall Risk Assessment, last 12 mths 1/21/2022   Able to walk? Yes   Fall in past 12 months? 0   Do you feel unsteady? 0   Are you worried about falling 0     Abuse Screening Questionnaire 1/21/2022   Do you ever feel afraid of your partner? N   Are you in a relationship with someone who physically or mentally threatens you? N   Is it safe for you to go home?  Y     ADL Assessment 1/21/2022   Feeding yourself No Help Needed   Getting from bed to chair No Help Needed   Getting dressed No Help Needed   Bathing or showering No Help Needed   Walk across the room (includes cane/walker) No Help Needed   Using the telphone No Help Needed   Taking your medications No Help Needed   Preparing meals No Help Needed   Managing money (expenses/bills) No Help Needed   Moderately strenuous housework (laundry) No Help Needed   Shopping for personal items (toiletries/medicines) No Help Needed   Shopping for groceries No Help Needed   Driving No Help Needed   Climbing a flight of stairs No Help Needed   Getting to places beyond walking distances No Help Needed     1. Have you been to the ER, urgent care clinic since your last visit? Hospitalized since your last visit? No    2. Have you seen or consulted any other health care providers outside of the [address] [place] [address] [place] since your last visit? Include any pap smears or colon screening.  No      Chief Complaint   Patient presents with    Medication Evaluation     Bupropion and Xanax f/u    Anxiety         Visit Vitals  /62 (BP 1 Location: Left arm, BP Patient Position: Sitting, BP Cuff Size: Adult)   Pulse (!) 114   Temp 97.5 °F (36.4 °C) (Temporal)   Resp 17   Ht 5' 4\" (1.626 m)   Wt 142 lb 3.2 oz (64.5 kg)   SpO2 97%   BMI 24.41 kg/m²       Pain Scale: 0 - No pain/10  Pain Location:     Hattie Neely is a 50 y.o. female presenting for/with:    Medication Evaluation (Bupropion and Xanax f/u) and Anxiety      Symptom review:    NO  Fever   NO  Shaking chills  NO  Cough  NO  Body aches  NO  Coughing up blood  NO  Chest congestion  NO  Chest pain  NO  Shortness of breath  NO  Profound Loss of smell/taste  NO  Nausea/Vomiting   NO  Loose stool/Diarrhea  NO  any skin issues    Patient Risk Factors Reviewed as follows:  NO  have you been in Close contact with confirmed COVID19 patient   NO  History of recent travel to affected geographical areas within the past 14 days  NO  COPD  NO  Active Cancer/Leukemia/Lymphoma/Chemotherapy  NO  Oral steroid use  NO  Pregnant  NO  Diabetes Mellitus  NO  Heart disease  NO  Asthma  NO Health care worker at home  NO Health care worker  NO Is there a Pregnant Woman in the home  NO Dialysis pt in the home   NO a large number of people living in the home  Recent Travel Screening and Travel History documentation     Travel Screening     Question   Response    In the last month, have you been in contact with someone who was confirmed or suspected to have Coronavirus / COVID-19? No / Unsure    Have you had a COVID-19 viral test in the last 14 days? No    Do you have any of the following new or worsening symptoms? None of these    Have you traveled internationally or domestically in the last month?   No      Travel History   Travel since 12/21/21    No documented travel since 12/21/21

## 2022-01-21 NOTE — PROGRESS NOTES
Chief Complaint   Patient presents with    Medication Evaluation     Bupropion and Xanax f/u    Anxiety         HPI:       is a 50 y.o. female. [employer] employee in [place].      Diabetes: Metformin 1,000 mg and Farxiga. No adverse effects.  Her BG has been running around 140, 170 at the highest.  Last A1C 7.2%     Iron deficiency: Doing well on iron.  Dose is spaced to 3 days per week.     New Issues:  Dale Munoz has been having a lot of anxiety. This has been worse over the past few months. She reports that she had side effects to the flu vaccine in November and she was told to stay home from the office since some of her symptoms were concerning for COVID. She had a hard time at home. Her parents both passed in the past 2 years. She lives in their family home. She has had feelings of panic that are worse when she is getting ready to go to work. She worries about her job performance and about keeping up. She finds herself distracted at work thinking about the death of her parents. She has lost weight unintentionally. She was started on Wellbutrin earlier this week. Reports she already feels a little better. Her Aunt is with her today who confirms this. She thinks she is ready to return to work on Monday. No Known Allergies    Current Outpatient Medications   Medication Sig    buPROPion XL (WELLBUTRIN XL) 150 mg tablet Take 1 Tablet by mouth daily. Indications: Anxiety and lack of focus    ALPRAZolam (XANAX) 0.25 mg tablet Take 1 Tablet by mouth two (2) times daily as needed for Anxiety (Panic). Max Daily Amount: 0.5 mg.    dapagliflozin (FARXIGA) 5 mg tab tablet Take 1 Tab by mouth daily. Indications: type 2 diabetes mellitus    Blood-Glucose Meter monitoring kit Use to test glucose once per day. Dx: E11.9.    glucose blood VI test strips (ASCENSIA AUTODISC VI, ONE TOUCH ULTRA TEST VI) strip Use to test glucose once per day.   Dx: E11.9.    metFORMIN (GLUCOPHAGE) 1,000 mg tablet TAKE 1 TABLET BY MOUTH EVERY DAY WITH BREAKFAST    iron aspgly,ba-P-J08-FA-Ca-suc (FERREX 150 FORTE PLUS) 816-51-03-4 mg-mg-mcg-mg cap cap Take 1 Cap by mouth daily.  levonorgestrel (MIRENA) 20 mcg/24 hr (5 years) IUD 1 Each by IntraUTERine route once. No current facility-administered medications for this visit. Past Medical History:   Diagnosis Date    Anemia     Diabetes ([address]        No past surgical history on file. Social History     Socioeconomic History    Marital status: SINGLE   Tobacco Use    Smoking status: Never Smoker    Smokeless tobacco: Never Used   Substance and Sexual Activity    Alcohol use: Yes    Drug use: No    Sexual activity: Yes       Family History   Problem Relation Age of Onset    Diabetes Mother     Diabetes Father        Above history reviewed. ROS:  Denies fever, chills, cough, chest pain, SOB,  nausea, vomiting, or diarrhea. Denies wt loss, wt gain, hemoptysis, hematochezia or melena. Physical Examination:    /62 (BP 1 Location: Left arm, BP Patient Position: Sitting, BP Cuff Size: Adult)   Pulse (!) 114   Temp 97.5 °F (36.4 °C) (Temporal)   Resp 17   Ht 5' 4\" (1.626 m)   Wt 142 lb 3.2 oz (64.5 kg)   SpO2 97%   BMI 24.41 kg/m²     General: Alert and Ox3, Fluent speech  Neck:  Supple, no adenopathy, JVD, mass or bruit  Chest:  Clear to Ausculation, without wheezes, rales, rubs or ronchi  Cardiac: RRR  Extremities:  No cyanosis, clubbing or edema  Neurologic:  Ambulatory without assist, CN 2-12 grossly intact. Moves all extremities. Skin: no rash  Lymphadenopathy: no cervical or supraclavicular nodes    ASSESSMENT AND PLAN:     1. Anxiety  Improved  Continue Wellbutrin  She feels ready to get back to work     2.  Grief reaction  Recommend counseling  She wants to wait and think about this for now      RTC in 2 weeks    Yogi Andersen NP

## 2022-01-31 ENCOUNTER — OFFICE VISIT (OUTPATIENT)
Dept: FAMILY MEDICINE CLINIC | Age: 52
End: 2022-01-31
Payer: COMMERCIAL

## 2022-01-31 VITALS
TEMPERATURE: 97.5 F | WEIGHT: 138.2 LBS | RESPIRATION RATE: 18 BRPM | SYSTOLIC BLOOD PRESSURE: 118 MMHG | DIASTOLIC BLOOD PRESSURE: 60 MMHG | HEIGHT: 64 IN | HEART RATE: 107 BPM | OXYGEN SATURATION: 97 % | BODY MASS INDEX: 23.6 KG/M2

## 2022-01-31 DIAGNOSIS — E11.9 TYPE 2 DIABETES MELLITUS WITHOUT COMPLICATION, WITHOUT LONG-TERM CURRENT USE OF INSULIN (HCC): ICD-10-CM

## 2022-01-31 DIAGNOSIS — F43.21 SITUATIONAL DEPRESSION: Primary | ICD-10-CM

## 2022-01-31 DIAGNOSIS — F41.9 ANXIETY: ICD-10-CM

## 2022-01-31 PROCEDURE — 99214 OFFICE O/P EST MOD 30 MIN: CPT | Performed by: NURSE PRACTITIONER

## 2022-01-31 RX ORDER — VENLAFAXINE HYDROCHLORIDE 37.5 MG/1
37.5 CAPSULE, EXTENDED RELEASE ORAL DAILY
Qty: 30 CAPSULE | Refills: 11 | Status: SHIPPED | OUTPATIENT
Start: 2022-01-31 | End: 2022-07-05 | Stop reason: SDDI

## 2022-01-31 NOTE — PROGRESS NOTES
Chief Complaint   Patient presents with    Depression     follow up with Wellbutrin.  Weight Loss     loss of appetitie, eating very small meals. No desire to eat. HPI:       is a 50 y.o. female. [employer] employee in [place].      Diabetes: Metformin 1,000 mg and Farxiga. No adverse effects.  Her BG has been running around 140, 170 at the highest.  Last A1C 7.2%     Iron deficiency: Doing well on iron.  Dose is spaced to 3 days per week.     New Issues:  She was brought in by the urging of her family today. She has been followed over the past month for worsening anxiety. She reports that she had side effects to the flu vaccine in November and she was told to stay home from the office since some of her symptoms were concerning for COVID. Her parents both passed in the past 2 years. She lives in their family home. She has had feelings of panic that are worse when she is getting ready to go to work. She worries about her job performance and about keeping up. She finds herself distracted at work thinking about the death of her parents. She was started on Wellbutrin on 1/17/22. At first it seemed to help, but now she is worse. Her cousin reports that Ashlee Rain becomes very anxious when work is brought up and cannot function. Lien commutes 3 hours each way. She is down another 4 lbs. Reports a poor appetite. She had a similar episode in 2019 when her mom first got sick. Took a break from work and was put on Zoloft, and then Effexor. This was helpful. She adamantly denies SI      No Known Allergies    Current Outpatient Medications   Medication Sig    buPROPion XL (WELLBUTRIN XL) 150 mg tablet Take 1 Tablet by mouth daily. Indications: Anxiety and lack of focus    dapagliflozin (FARXIGA) 5 mg tab tablet Take 1 Tab by mouth daily. Indications: type 2 diabetes mellitus    Blood-Glucose Meter monitoring kit Use to test glucose once per day.   Dx: E11.9.    glucose blood VI test strips (ASCENSIA AUTODISC VI, ONE TOUCH ULTRA TEST VI) strip Use to test glucose once per day. Dx: E11.9.    metFORMIN (GLUCOPHAGE) 1,000 mg tablet TAKE 1 TABLET BY MOUTH EVERY DAY WITH BREAKFAST    iron aspgly,ak-T-P15-FA-Ca-suc (FERREX 150 FORTE PLUS) 016-39-06-1 mg-mg-mcg-mg cap cap Take 1 Cap by mouth daily.  levonorgestrel (MIRENA) 20 mcg/24 hr (5 years) IUD 1 Each by IntraUTERine route once.  ALPRAZolam (XANAX) 0.25 mg tablet Take 1 Tablet by mouth two (2) times daily as needed for Anxiety (Panic). Max Daily Amount: 0.5 mg. (Patient not taking: Reported on 1/31/2022)     No current facility-administered medications for this visit. Past Medical History:   Diagnosis Date    Anemia     Diabetes ([address]        No past surgical history on file. Social History     Socioeconomic History    Marital status: SINGLE   Tobacco Use    Smoking status: Never Smoker    Smokeless tobacco: Never Used   Substance and Sexual Activity    Alcohol use: Yes    Drug use: No    Sexual activity: Yes       Family History   Problem Relation Age of Onset    Diabetes Mother     Diabetes Father        Above history reviewed. ROS:  Denies fever, chills, cough, chest pain, SOB,  nausea, vomiting, or diarrhea. Denies wt loss, wt gain, hemoptysis, hematochezia or melena. Physical Examination:    /60 (BP 1 Location: Left arm, BP Patient Position: Sitting, BP Cuff Size: Adult long)   Pulse (!) 107   Temp 97.5 °F (36.4 °C) (Temporal)   Resp 18   Ht 5' 4\" (1.626 m)   Wt 138 lb 3.2 oz (62.7 kg)   SpO2 97%   BMI 23.72 kg/m²     General: Alert and Ox3, tearful, appears dettached and withdrawn  Neck:  Supple, no adenopathy, JVD, mass or bruit  Chest:  Clear to Ausculation, without wheezes, rales, rubs or ronchi  Cardiac: tachycardia   Extremities:  No cyanosis, clubbing or edema  Neurologic:  Ambulatory without assist, CN 2-12 grossly intact. Moves all extremities.   Skin: no rash  Lymphadenopathy: no cervical or supraclavicular nodes    ASSESSMENT AND PLAN:     1. Situational depression  Not responding to Wellbutin  Switch to Effexor  Needs to start talk therapy  - venlafaxine-SR (EFFEXOR-XR) 37.5 mg capsule; Take 1 Capsule by mouth daily. Dispense: 30 Capsule; Refill: 11  - REFERRAL TO SOCIAL WORK    2. Type 2 diabetes mellitus without complication, without long-term current use of insulin (HCC)  Good control  Continue current regimen     3.  Anxiety  Taking out of work until 3/1/22  Patient to request FMLA forms  Unable to function in high paced work environment at this time   - [id number] [facility]     RTC in 1 week for close f/up    Lindy Dyer NP

## 2022-01-31 NOTE — LETTER
NOTIFICATION RETURN TO WORK / SCHOOL    1/31/2022 10:46 AM    Ms. Michelle Singh  [address] [phone]      To Whom It May Concern:    Michelle Singh is currently under the care of Dimas Smith. She will return to work/school on: 3/1/22    If there are questions or concerns please have the patient contact our office.         Sincerely,      Chico Mehta NP

## 2022-01-31 NOTE — PROGRESS NOTES
Chief Complaint   Patient presents with    Depression     follow up with Wellbutrin.  Weight Loss     loss of appetitie, eating very small meals. No desire to eat.      3 most recent PHQ Screens 1/31/2022   PHQ Not Done -   Little interest or pleasure in doing things Not at all   Feeling down, depressed, irritable, or hopeless Not at all   Total Score PHQ 2 0   Trouble falling or staying asleep, or sleeping too much -   Feeling tired or having little energy -   Poor appetite, weight loss, or overeating -   Feeling bad about yourself - or that you are a failure or have let yourself or your family down -   Trouble concentrating on things such as school, work, reading, or watching TV -   Moving or speaking so slowly that other people could have noticed; or the opposite being so fidgety that others notice -   Thoughts of being better off dead, or hurting yourself in some way -   PHQ 9 Score -   How difficult have these problems made it for you to do your work, take care of your home and get along with others -     Learning Assessment 1/31/2022   PRIMARY LEARNER Patient   HIGHEST LEVEL OF EDUCATION - PRIMARY LEARNER  -   BARRIERS PRIMARY LEARNER -   CO-LEARNER CAREGIVER -   PRIMARY LANGUAGE ENGLISH   LEARNER PREFERENCE PRIMARY READING     -   ANSWERED BY patient   RELATIONSHIP SELF     Fall Risk Assessment, last 12 mths 1/31/2022   Able to walk? Yes   Fall in past 12 months? 0   Do you feel unsteady? 0   Are you worried about falling 0     Abuse Screening Questionnaire 1/31/2022   Do you ever feel afraid of your partner? N   Are you in a relationship with someone who physically or mentally threatens you? N   Is it safe for you to go home?  Y     ADL Assessment 1/31/2022   Feeding yourself No Help Needed   Getting from bed to chair No Help Needed   Getting dressed No Help Needed   Bathing or showering No Help Needed   Walk across the room (includes cane/walker) No Help Needed   Using the telphone No Help Needed Taking your medications No Help Needed   Preparing meals No Help Needed   Managing money (expenses/bills) No Help Needed   Moderately strenuous housework (laundry) No Help Needed   Shopping for personal items (toiletries/medicines) No Help Needed   Shopping for groceries No Help Needed   Driving No Help Needed   Climbing a flight of stairs No Help Needed   Getting to places beyond walking distances No Help Needed     1. Have you been to the ER, urgent care clinic since your last visit? Hospitalized since your last visit? No    2. Have you seen or consulted any other health care providers outside of the [address] [place] [address] [place] since your last visit? Include any pap smears or colon screening. No      Chief Complaint   Patient presents with    Depression     follow up with Wellbutrin.  Weight Loss     loss of appetitie, eating very small meals. No desire to eat. Visit Vitals  /60 (BP 1 Location: Left arm, BP Patient Position: Sitting, BP Cuff Size: Adult long)   Pulse (!) 107   Temp 97.5 °F (36.4 °C) (Temporal)   Resp 18   Ht 5' 4\" (1.626 m)   Wt 138 lb 3.2 oz (62.7 kg)   SpO2 97%   BMI 23.72 kg/m²       Pain Scale: 0 - No pain/10  Pain Location:     Glenis Abebe is a 50 y.o. female presenting for/with:    Depression (follow up with Wellbutrin. ) and Weight Loss (loss of appetitie, eating very small meals.  No desire to eat. )      Symptom review:    NO  Fever   NO  Shaking chills  NO  Cough  NO  Body aches  NO  Coughing up blood  NO  Chest congestion  NO  Chest pain  NO  Shortness of breath  NO  Profound Loss of smell/taste  NO  Nausea/Vomiting   NO  Loose stool/Diarrhea  NO  any skin issues    Patient Risk Factors Reviewed as follows:  NO  have you been in Close contact with confirmed COVID19 patient   NO  History of recent travel to affected geographical areas within the past 14 days  NO  COPD  NO  Active Cancer/Leukemia/Lymphoma/Chemotherapy  NO  Oral steroid use  NO  Pregnant  NO Diabetes Mellitus  NO  Heart disease  NO  Asthma  NO Health care worker at home  [address] care worker  NO Is there a Pregnant Woman in the home  NO Dialysis pt in the home   NO a large number of people living in the home  Recent Travel Screening and Travel History documentation     Travel Screening     Question   Response    In the last month, have you been in contact with someone who was confirmed or suspected to have Coronavirus / COVID-19? No / Unsure    Have you had a COVID-19 viral test in the last 14 days? No    Do you have any of the following new or worsening symptoms? None of these    Have you traveled internationally or domestically in the last month?   No      Travel History   Travel since 12/31/21    No documented travel since 12/31/21

## 2022-02-07 ENCOUNTER — OFFICE VISIT (OUTPATIENT)
Dept: FAMILY MEDICINE CLINIC | Age: 52
End: 2022-02-07
Payer: COMMERCIAL

## 2022-02-07 VITALS
SYSTOLIC BLOOD PRESSURE: 96 MMHG | DIASTOLIC BLOOD PRESSURE: 58 MMHG | WEIGHT: 137.6 LBS | TEMPERATURE: 97.5 F | OXYGEN SATURATION: 98 % | RESPIRATION RATE: 18 BRPM | BODY MASS INDEX: 23.49 KG/M2 | HEIGHT: 64 IN | HEART RATE: 103 BPM

## 2022-02-07 DIAGNOSIS — F43.21 SITUATIONAL DEPRESSION: Primary | ICD-10-CM

## 2022-02-07 DIAGNOSIS — F41.9 ANXIETY: ICD-10-CM

## 2022-02-07 DIAGNOSIS — E11.9 TYPE 2 DIABETES MELLITUS WITHOUT COMPLICATION, WITHOUT LONG-TERM CURRENT USE OF INSULIN (HCC): ICD-10-CM

## 2022-02-07 DIAGNOSIS — R03.1 LOW BLOOD PRESSURE READING: ICD-10-CM

## 2022-02-07 PROCEDURE — 99213 OFFICE O/P EST LOW 20 MIN: CPT | Performed by: NURSE PRACTITIONER

## 2022-02-07 NOTE — PROGRESS NOTES
No chief complaint on file. 3 most recent PHQ Screens 2/7/2022   PHQ Not Done -   Little interest or pleasure in doing things Several days   Feeling down, depressed, irritable, or hopeless Several days   Total Score PHQ 2 2   Trouble falling or staying asleep, or sleeping too much -   Feeling tired or having little energy -   Poor appetite, weight loss, or overeating -   Feeling bad about yourself - or that you are a failure or have let yourself or your family down -   Trouble concentrating on things such as school, work, reading, or watching TV -   Moving or speaking so slowly that other people could have noticed; or the opposite being so fidgety that others notice -   Thoughts of being better off dead, or hurting yourself in some way -   PHQ 9 Score -   How difficult have these problems made it for you to do your work, take care of your home and get along with others -     Learning Assessment 2/7/2022   PRIMARY LEARNER Patient   HIGHEST LEVEL OF EDUCATION - PRIMARY LEARNER  -   BARRIERS PRIMARY LEARNER -   CO-LEARNER CAREGIVER -   PRIMARY LANGUAGE ENGLISH   LEARNER PREFERENCE PRIMARY READING     -   ANSWERED BY patient   RELATIONSHIP SELF     Fall Risk Assessment, last 12 mths 2/7/2022   Able to walk? Yes   Fall in past 12 months? 0   Do you feel unsteady? 0   Are you worried about falling 0     Abuse Screening Questionnaire 2/7/2022   Do you ever feel afraid of your partner? N   Are you in a relationship with someone who physically or mentally threatens you? N   Is it safe for you to go home?  Y     ADL Assessment 2/7/2022   Feeding yourself No Help Needed   Getting from bed to chair No Help Needed   Getting dressed No Help Needed   Bathing or showering No Help Needed   Walk across the room (includes cane/walker) No Help Needed   Using the telphone No Help Needed   Taking your medications No Help Needed   Preparing meals No Help Needed   Managing money (expenses/bills) No Help Needed   Moderately strenuous housework (laundry) No Help Needed   Shopping for personal items (toiletries/medicines) No Help Needed   Shopping for groceries No Help Needed   Driving No Help Needed   Climbing a flight of stairs No Help Needed   Getting to places beyond walking distances No Help Needed     1. Have you been to the ER, urgent care clinic since your last visit? Hospitalized since your last visit? No    2. Have you seen or consulted any other health care providers outside of the [email or web address] [place] [address] [place] since your last visit? Include any pap smears or colon screening. No      No chief complaint on file. Visit Vitals  BP (!) 96/58 (BP 1 Location: Left arm, BP Patient Position: Sitting, BP Cuff Size: Adult long)   Pulse (!) 103   Temp 97.5 °F (36.4 °C) (Temporal)   Resp 18   Ht 5' 4\" (1.626 m)   Wt 137 lb 9.6 oz (62.4 kg)   SpO2 98%   BMI 23.62 kg/m²       Pain Scale: 0 - No pain/10  Pain Location:     Marielos Corley is a 50 y.o. female presenting for/with:    No chief complaint on file.       Symptom review:    NO  Fever   NO  Shaking chills  NO  Cough  NO  Body aches  NO  Coughing up blood  NO  Chest congestion  NO  Chest pain  NO  Shortness of breath  NO  Profound Loss of smell/taste  NO  Nausea/Vomiting   NO  Loose stool/Diarrhea  NO  any skin issues    Patient Risk Factors Reviewed as follows:  NO  have you been in Close contact with confirmed COVID19 patient   NO  History of recent travel to affected geographical areas within the past 14 days  NO  COPD  NO  Active Cancer/Leukemia/Lymphoma/Chemotherapy  NO  Oral steroid use  NO  Pregnant  NO  Diabetes Mellitus  NO  Heart disease  NO  Asthma  NO Health care worker at home  NO Health care worker  NO Is there a Pregnant Woman in the home  NO Dialysis pt in the home   NO a large number of people living in the home  Recent Travel Screening and Travel History documentation     Travel Screening     Question   Response    In the last month, have you been in contact with someone who was confirmed or suspected to have Coronavirus / COVID-19? No / Unsure    Have you had a COVID-19 viral test in the last 14 days? No    Do you have any of the following new or worsening symptoms? None of these    Have you traveled internationally or domestically in the last month?   No      Travel History   Travel since 01/07/22    No documented travel since 01/07/22

## 2022-02-07 NOTE — PROGRESS NOTES
No chief complaint on file. HPI:       is a 50 y.o. female. [employer] employee in [place].      Diabetes: Metformin 1,000 mg and Farxiga. No adverse effects. Last A1C 7.2%     Iron deficiency: Doing well on iron.  Dose is spaced to 3 days per week.     New Issues:  She has been followed over the past month for worsening anxiety. She reports that she had side effects to the flu vaccine in November and she was told to stay home from the office since some of her symptoms were concerning for COVID. Her parents both passed in the past 2 years. She lives in their family home. She has had feelings of panic that are worse when she is getting ready to go to work. She worries about her job performance and about keeping up. She finds herself distracted at work thinking about the death of her parents. She was started on Wellbutrin on 1/17/22. Was not effective and she was switched to Effexor on 1/31/22. She feels better on this. Work has been a trigger for [employer]. She is currently on FMLA until 3/1/22 to work through her issues. Lien commutes 3 hours each way. She had a similar episode in 2019 when her mom first got sick. She adamantly denies SI. She has not called to schedule talk therapy with Karly Jean, CANDI, yet. BP is running low today. Asymptomatic. Has not been good about her nutrition or taking her diabetes medications regularly. No Known Allergies    Current Outpatient Medications   Medication Sig    venlafaxine-SR (EFFEXOR-XR) 37.5 mg capsule Take 1 Capsule by mouth daily.  ALPRAZolam (XANAX) 0.25 mg tablet Take 1 Tablet by mouth two (2) times daily as needed for Anxiety (Panic). Max Daily Amount: 0.5 mg.    dapagliflozin (FARXIGA) 5 mg tab tablet Take 1 Tab by mouth daily. Indications: type 2 diabetes mellitus    Blood-Glucose Meter monitoring kit Use to test glucose once per day.   Dx: E11.9.    glucose blood VI test strips (ASCENSIA AUTODISC VI, ONE TOUCH ULTRA TEST VI) strip Use to test glucose once per day. Dx: E11.9.    metFORMIN (GLUCOPHAGE) 1,000 mg tablet TAKE 1 TABLET BY MOUTH EVERY DAY WITH BREAKFAST    iron aspgly,ew-[id number]-FA-Ca-suc (FERREX 150 FORTE PLUS) 978-68-59-2 mg-mg-mcg-mg cap cap Take 1 Cap by mouth daily.  levonorgestrel (MIRENA) 20 mcg/24 hr (5 years) IUD 1 Each by IntraUTERine route once. No current facility-administered medications for this visit. Past Medical History:   Diagnosis Date    Anemia     Diabetes ([address]        No past surgical history on file. Social History     Socioeconomic History    Marital status: SINGLE   Tobacco Use    Smoking status: Never Smoker    Smokeless tobacco: Never Used   Substance and Sexual Activity    Alcohol use: Yes    Drug use: No    Sexual activity: Yes       Family History   Problem Relation Age of Onset    Diabetes Mother     Diabetes Father        Above history reviewed. ROS:  Denies fever, chills, cough, chest pain, SOB,  nausea, vomiting, or diarrhea. Denies wt loss, wt gain, hemoptysis, hematochezia or melena. Physical Examination:    BP (!) 96/58 (BP 1 Location: Left arm, BP Patient Position: Sitting, BP Cuff Size: Adult long)   Pulse (!) 103   Temp 97.5 °F (36.4 °C) (Temporal)   Resp 18   Ht 5' 4\" (1.626 m)   Wt 137 lb 9.6 oz (62.4 kg)   SpO2 98%   BMI 23.62 kg/m²     General: Alert and Ox3, Fluent speech, appears anxious   Neck:  Supple, no adenopathy, JVD, mass or bruit  Chest:  Clear to Ausculation, without wheezes, rales, rubs or ronchi  Cardiac: tachycardia  Extremities:  No cyanosis, clubbing or edema  Neurologic:  Ambulatory without assist, CN 2-12 grossly intact. Moves all extremities. Skin: no rash  Lymphadenopathy: no cervical or supraclavicular nodes    ASSESSMENT AND PLAN:     1. Situational depression  Patient plans to call to start talk therapy today    2.  Type 2 diabetes mellitus without complication, without long-term current use of insulin ([address]  Encouraged compliance with medication regimen     3. Anxiety  Somewhat improved on Effexor  Close follow-up    4.  Low blood pressure reading  Asymptomatic  Monitoring      RTC next week    Michelle Negro NP

## 2022-02-18 ENCOUNTER — OFFICE VISIT (OUTPATIENT)
Dept: FAMILY MEDICINE CLINIC | Age: 52
End: 2022-02-18
Payer: COMMERCIAL

## 2022-02-18 VITALS
OXYGEN SATURATION: 97 % | WEIGHT: 140.2 LBS | HEIGHT: 64 IN | BODY MASS INDEX: 23.93 KG/M2 | TEMPERATURE: 98.4 F | SYSTOLIC BLOOD PRESSURE: 122 MMHG | RESPIRATION RATE: 17 BRPM | DIASTOLIC BLOOD PRESSURE: 62 MMHG | HEART RATE: 101 BPM

## 2022-02-18 DIAGNOSIS — F41.9 ANXIETY: Primary | ICD-10-CM

## 2022-02-18 PROCEDURE — 99213 OFFICE O/P EST LOW 20 MIN: CPT | Performed by: NURSE PRACTITIONER

## 2022-02-18 RX ORDER — BUPROPION HYDROCHLORIDE 150 MG/1
TABLET ORAL
COMMUNITY
Start: 2022-02-13 | End: 2022-04-04

## 2022-02-18 NOTE — PROGRESS NOTES
Chief Complaint   Patient presents with    Anxiety     2 week follow up    Depression         HPI:       is a 50 y.o. female. [employer] employee in [place].      Diabetes: Metformin 1,000 mg and Farxiga. No adverse effects. Last A1C 7.2%     Iron deficiency: Doing well on iron.  Dose is spaced to 3 days per week.     Anxiety: She reports that she had side effects to the flu vaccine in November and she was told to stay home from the office since some of her symptoms were concerning for COVID. Her parents both passed in the past 2 years. She lives in their family home. She has had feelings of panic that are worse when she is getting ready to go to work. She worries about her job performance and about keeping up. She finds herself distracted at work thinking about the death of her parents. She was started on Wellbutrin on 1/17/22. Was not effective and she was switched to Effexor on 1/31/22. She feels better on this. She is currently on FMLA until 3/1/22 to work through her issues. Lien commutes 3 hours each way. She had a similar episode in 2019 when her mom first got sick. She adamantly denies SI. She has started talk therapy with Indio Tao LCSW. New Issues:  Fili Oropeza looks more like herself today. Weight has stabilized. BP has improved. Lien hates to take medications and wonders how long she needs to take the Effexor. No Known Allergies    Current Outpatient Medications   Medication Sig    buPROPion XL (WELLBUTRIN XL) 150 mg tablet     venlafaxine-SR (EFFEXOR-XR) 37.5 mg capsule Take 1 Capsule by mouth daily.  ALPRAZolam (XANAX) 0.25 mg tablet Take 1 Tablet by mouth two (2) times daily as needed for Anxiety (Panic). Max Daily Amount: 0.5 mg.    dapagliflozin (FARXIGA) 5 mg tab tablet Take 1 Tab by mouth daily. Indications: type 2 diabetes mellitus    Blood-Glucose Meter monitoring kit Use to test glucose once per day.   Dx: E11.9.    glucose blood VI test strips (ASCENSIA AUTODISC VI, ONE TOUCH ULTRA TEST VI) strip Use to test glucose once per day. Dx: E11.9.    metFORMIN (GLUCOPHAGE) 1,000 mg tablet TAKE 1 TABLET BY MOUTH EVERY DAY WITH BREAKFAST    iron aspgly,kz-M-A98-FA-Ca-suc (FERREX 150 FORTE PLUS) [id number] mg-mg-mcg-mg cap cap Take 1 Cap by mouth daily.  levonorgestrel (MIRENA) 20 mcg/24 hr (5 years) IUD 1 Each by IntraUTERine route once. No current facility-administered medications for this visit. Past Medical History:   Diagnosis Date    Anemia     Diabetes ([address]        No past surgical history on file. Social History     Socioeconomic History    Marital status: SINGLE   Tobacco Use    Smoking status: Never Smoker    Smokeless tobacco: Never Used   Substance and Sexual Activity    Alcohol use: Yes    Drug use: No    Sexual activity: Yes       Family History   Problem Relation Age of Onset    Diabetes Mother     Diabetes Father        Above history reviewed. ROS:  Denies fever, chills, cough, chest pain, SOB,  nausea, vomiting, or diarrhea. Denies wt loss, wt gain, hemoptysis, hematochezia or melena. Physical Examination:    /62 (BP 1 Location: Left arm, BP Patient Position: Sitting, BP Cuff Size: Adult)   Pulse (!) 101   Temp 98.4 °F (36.9 °C) (Temporal)   Resp 17   Ht 5' 4\" (1.626 m)   Wt 140 lb 3.2 oz (63.6 kg)   SpO2 97%   BMI 24.07 kg/m²     General: Alert and Ox3, Fluent speech  Neck:  Supple, no adenopathy, JVD, mass or bruit  Chest:  Clear to Ausculation, without wheezes, rales, rubs or ronchi  Cardiac: RRR  Extremities:  No cyanosis, clubbing or edema  Neurologic:  Ambulatory without assist, CN 2-12 grossly intact. Moves all extremities. Skin: no rash  Lymphadenopathy: no cervical or supraclavicular nodes    ASSESSMENT AND PLAN:     1. Anxiety  Improved  Continue talk therapy  Continue Effexor.   Plan for a minimum of 3 months  Continue plan for return to work in March     RTC in March    Kary Kawasaki, NP

## 2022-02-18 NOTE — PROGRESS NOTES
Chief Complaint   Patient presents with    Anxiety     2 week follow up    Depression     3 most recent PHQ Screens 2/18/2022   PHQ Not Done -   Little interest or pleasure in doing things Not at all   Feeling down, depressed, irritable, or hopeless Not at all   Total Score PHQ 2 0   Trouble falling or staying asleep, or sleeping too much Not at all   Feeling tired or having little energy Not at all   Poor appetite, weight loss, or overeating Not at all   Feeling bad about yourself - or that you are a failure or have let yourself or your family down Not at all   Trouble concentrating on things such as school, work, reading, or watching TV Not at all   Moving or speaking so slowly that other people could have noticed; or the opposite being so fidgety that others notice Not at all   Thoughts of being better off dead, or hurting yourself in some way Not at all   PHQ 9 Score 0   How difficult have these problems made it for you to do your work, take care of your home and get along with others Not difficult at all     Learning Assessment 2/18/2022   PRIMARY LEARNER Patient   HIGHEST LEVEL OF EDUCATION - PRIMARY LEARNER  -   [facility] LEARNER -   [address] [place] [address],[facility]     -   ANSWERED BY patient   RELATIONSHIP SELF     Fall Risk Assessment, last 12 mths 2/18/2022   Able to walk? Yes   Fall in past 12 months? 0   Do you feel unsteady? 0   Are you worried about falling 0     Abuse Screening Questionnaire 2/18/2022   Do you ever feel afraid of your partner? N   Are you in a relationship with someone who physically or mentally threatens you? N   Is it safe for you to go home?  Y     ADL Assessment 2/18/2022   Feeding yourself No Help Needed   Getting from bed to chair No Help Needed   Getting dressed No Help Needed   Bathing or showering No Help Needed   Walk across the room (includes cane/walker) No Help Needed   Using the telphone No Help Needed Taking your medications No Help Needed   Preparing meals No Help Needed   Managing money (expenses/bills) No Help Needed   Moderately strenuous housework (laundry) No Help Needed   Shopping for personal items (toiletries/medicines) No Help Needed   Shopping for groceries No Help Needed   Driving No Help Needed   Climbing a flight of stairs No Help Needed   Getting to places beyond walking distances No Help Needed     1. Have you been to the ER, urgent care clinic since your last visit? Hospitalized since your last visit? No    2. Have you seen or consulted any other health care providers outside of the [address] [place] [address] [place] since your last visit? Include any pap smears or colon screening.  No      Chief Complaint   Patient presents with    Anxiety     2 week follow up    Depression         Visit Vitals  /62 (BP 1 Location: Left arm, BP Patient Position: Sitting, BP Cuff Size: Adult)   Pulse (!) 101   Temp 98.4 °F (36.9 °C) (Temporal)   Resp 17   Ht 5' 4\" (1.626 m)   Wt 140 lb 3.2 oz (63.6 kg)   SpO2 97%   BMI 24.07 kg/m²       Pain Scale: 0 - No pain/10  Pain Location:     Marc Weldon is a 50 y.o. female presenting for/with:    Anxiety (2 week follow up) and Depression      Symptom review:    NO  Fever   NO  Shaking chills  NO  Cough  NO  Body aches  NO  Coughing up blood  NO  Chest congestion  NO  Chest pain  NO  Shortness of breath  NO  Profound Loss of smell/taste  NO  Nausea/Vomiting   NO  Loose stool/Diarrhea  NO  any skin issues    Patient Risk Factors Reviewed as follows:  NO  have you been in Close contact with confirmed COVID19 patient   NO  History of recent travel to affected geographical areas within the past 14 days  NO  COPD  NO  Active Cancer/Leukemia/Lymphoma/Chemotherapy  NO  Oral steroid use  NO  Pregnant  NO  Diabetes Mellitus  NO  Heart disease  NO  Asthma  NO Health care worker at home  NO Health care worker  NO Is there a Pregnant Woman in the home  NO Dialysis pt in the home NO a large number of people living in the home  Recent Travel Screening and Travel History documentation     Travel Screening     Question   Response    In the last month, have you been in contact with someone who was confirmed or suspected to have Coronavirus / COVID-19? No / Unsure    Have you had a COVID-19 viral test in the last 14 days? No    Do you have any of the following new or worsening symptoms? None of these    Have you traveled internationally or domestically in the last month?   No      Travel History   Travel since 01/18/22    No documented travel since 01/18/22

## 2022-03-18 PROBLEM — F43.21 SITUATIONAL DEPRESSION: Status: ACTIVE | Noted: 2019-11-25

## 2022-04-04 ENCOUNTER — OFFICE VISIT (OUTPATIENT)
Dept: FAMILY MEDICINE CLINIC | Age: 52
End: 2022-04-04
Payer: COMMERCIAL

## 2022-04-04 VITALS
OXYGEN SATURATION: 100 % | DIASTOLIC BLOOD PRESSURE: 70 MMHG | TEMPERATURE: 97.5 F | RESPIRATION RATE: 18 BRPM | SYSTOLIC BLOOD PRESSURE: 110 MMHG | HEART RATE: 92 BPM | HEIGHT: 64 IN | BODY MASS INDEX: 22.94 KG/M2 | WEIGHT: 134.4 LBS

## 2022-04-04 DIAGNOSIS — F43.21 SITUATIONAL DEPRESSION: ICD-10-CM

## 2022-04-04 DIAGNOSIS — E11.9 TYPE 2 DIABETES MELLITUS WITHOUT COMPLICATION, WITHOUT LONG-TERM CURRENT USE OF INSULIN (HCC): Primary | ICD-10-CM

## 2022-04-04 PROCEDURE — 99213 OFFICE O/P EST LOW 20 MIN: CPT | Performed by: NURSE PRACTITIONER

## 2022-04-04 NOTE — PROGRESS NOTES
Chief Complaint   Patient presents with    Depression     follow up, stopped taking Buproprion. HPI:       is a 50 y.o. female. [employer] employee in [place].      Diabetes: Metformin 1,000 mg and Farxiga. No adverse effects. Last A1C 7.2%     Iron deficiency: Doing well on iron.  Dose is spaced to 3 days per week.     Anxiety: Her parents both passed in the past 2 years. She lives in their family home. She has had feelings of panic that are worse when she is getting ready to go to work. She was started on Wellbutrin on 1/17/22. Was not effective and she was switched to Effexor on 1/31/22. She feels better on this. Lien commutes 3 hours each way to work. She had a similar episode in 2019 when her mom first got sick. She adamantly denies SI. She did some talk therapy with Jerman De La Cruz LCSW. New Issues:  She is here for a check-up. Down 6 lbs. Reports she has just been busy. No Known Allergies    Current Outpatient Medications   Medication Sig    venlafaxine-SR (EFFEXOR-XR) 37.5 mg capsule Take 1 Capsule by mouth daily.  ALPRAZolam (XANAX) 0.25 mg tablet Take 1 Tablet by mouth two (2) times daily as needed for Anxiety (Panic). Max Daily Amount: 0.5 mg.    dapagliflozin (FARXIGA) 5 mg tab tablet Take 1 Tab by mouth daily. Indications: type 2 diabetes mellitus    Blood-Glucose Meter monitoring kit Use to test glucose once per day. Dx: E11.9.    glucose blood VI test strips (ASCENSIA AUTODISC VI, ONE TOUCH ULTRA TEST VI) strip Use to test glucose once per day. Dx: E11.9.    metFORMIN (GLUCOPHAGE) 1,000 mg tablet TAKE 1 TABLET BY MOUTH EVERY DAY WITH BREAKFAST    iron aspgly,[id number]-G-N44-FA-Ca-suc (FERREX 150 FORTE PLUS) 639-14-70-6 mg-mg-mcg-mg cap cap Take 1 Cap by mouth daily.  levonorgestrel (MIRENA) 20 mcg/24 hr (5 years) IUD 1 Each by IntraUTERine route once. No current facility-administered medications for this visit.        Past Medical History:   Diagnosis Date  Anemia     Diabetes ([facility]        No past surgical history on file. Social History     Socioeconomic History    Marital status: SINGLE   Tobacco Use    Smoking status: Never Smoker    Smokeless tobacco: Never Used   Substance and Sexual Activity    Alcohol use: Yes    Drug use: No    Sexual activity: Yes       Family History   Problem Relation Age of Onset    Diabetes Mother     Diabetes Father        Above history reviewed. ROS:  Denies fever, chills, cough, chest pain, SOB,  nausea, vomiting, or diarrhea. Denies wt loss, wt gain, hemoptysis, hematochezia or melena. Physical Examination:    /70 (BP 1 Location: Left arm, BP Patient Position: Sitting, BP Cuff Size: Adult)   Pulse 92   Temp 97.5 °F (36.4 °C) (Temporal)   Resp 18   Ht 5' 4\" (1.626 m)   Wt 134 lb 6.4 oz (61 kg)   SpO2 100%   BMI 23.07 kg/m²     General: Alert and Ox3, Fluent speech  Neck:  Supple, no adenopathy, JVD, mass or bruit  Chest:  Clear to Ausculation, without wheezes, rales, rubs or ronchi  Cardiac: RRR  Extremities:  No cyanosis, clubbing or edema  Neurologic:  Ambulatory without assist, CN 2-12 grossly intact. Moves all extremities.   Skin: no rash  Lymphadenopathy: no cervical or supraclavicular nodes    3 most recent PHQ Screens 4/4/2022   PHQ Not Done -   Little interest or pleasure in doing things Several days   Feeling down, depressed, irritable, or hopeless Several days   Total Score PHQ 2 2   Trouble falling or staying asleep, or sleeping too much -   Feeling tired or having little energy -   Poor appetite, weight loss, or overeating -   Feeling bad about yourself - or that you are a failure or have let yourself or your family down -   Trouble concentrating on things such as school, work, reading, or watching TV -   Moving or speaking so slowly that other people could have noticed; or the opposite being so fidgety that others notice -   Thoughts of being better off dead, or hurting yourself in some way -   PHQ 9 Score -   How difficult have these problems made it for you to do your work, take care of your home and get along with others -      ASSESSMENT AND PLAN:     1. Type 2 diabetes mellitus without complication, without long-term current use of insulin (HCC)  Checking labs  BG running 120-170 at home   - METABOLIC PANEL, COMPREHENSIVE; Future  - HEMOGLOBIN A1C WITH EAG; Future    2.  Situational depression  Offered increase in Effexor  Patient declines at this time      RTC in 3 months    Mayela Otero NP

## 2022-04-04 NOTE — PROGRESS NOTES
"PULMONOLOGY PROGRESS NOTE    Date of Admission: 5/2/2020    CC/Reason for Hospital visit:  COPD, pulmonary nodule  SUBJECTIVE      Events reviewed since last seen. Going home today    ROS: A Problem Pertinent review of systems was negative except for items noted in HPI.  Past Medical, Family, and Social/Substance History has been reviewed: No interval changes.    OBJECTIVE   Vital signs:  Temp: 98.4  F (36.9  C) Temp src: Oral BP: 133/62 Pulse: 72 Heart Rate: 92 Resp: 20 SpO2: 98 % O2 Device: Nasal cannula Oxygen Delivery: 1.5 LPM   Weight: 47.1 kg (103 lb 14.4 oz)  Estimated body mass index is 19 kg/m  as calculated from the following:    Height as of 2/14/20: 1.575 m (5' 2.01\").    Weight as of this encounter: 47.1 kg (103 lb 14.4 oz).      I/O last 3 completed shifts:  In: 940 [P.O.:840; I.V.:100]  Out: 1400 [Urine:1400]    CONSTITUTIONAL/GENERAL APPEARANCE: Alert female. No Apparent Distress.  PSYCHIATRIC: Pleasant and appropriate mood and affect. Oriented x 3.  EARS, NOSE,THROAT,MOUTH: External ears and nose overall normal. Normal oral mucosa.   NECK: Neck appearance normal. No neck masses and the thyroid is not enlarged.   RESPIRATORY: Non-labored effort. Decreased BS, prolonged exp phase, no change.  CARDIOVASCULAR: S1, S2, regular rate and rhythm.    LABORATORY ASSESSMENT    Arterial Blood Gas  Recent Labs   Lab 05/06/20  0532 05/04/20  0534 05/02/20  2135 05/02/20  1756 05/02/20  0834   PH  --   --  7.24* 7.25*  --    PCO2  --   --  72* 68*  --    PO2  --   --  250* 180*  --    HCO3  --   --  31* 30*  --    O2PER 2L PER RN 2lpm  --  8L 24     CBC  Recent Labs   Lab 05/05/20  0535 05/04/20  0534 05/03/20  1018 05/02/20  0834   WBC 5.6 6.3 6.9 7.8   RBC 4.64 4.84 5.25* 5.43*   HGB 13.4 14.2 15.5 16.1*   HCT 43.4 44.9 47.9* 48.2*   MCV 94 93 91 89   MCH 28.9 29.3 29.5 29.7   MCHC 30.9* 31.6 32.4 33.4   RDW 12.9 12.9 13.0 12.8    201 197 232     BMP  Recent Labs   Lab 05/05/20  0535 05/04/20  0534 " Alla Mishra is a 50 y.o. female presenting for/with:    Chief Complaint   Patient presents with    Depression     follow up, stopped taking Buproprion. Visit Vitals  /70 (BP 1 Location: Left arm, BP Patient Position: Sitting, BP Cuff Size: Adult)   Pulse 92   Temp 97.5 °F (36.4 °C) (Temporal)   Resp 18   Ht 5' 4\" (1.626 m)   Wt 134 lb 6.4 oz (61 kg)   SpO2 100%   BMI 23.07 kg/m²     Pain Scale: 0 - No pain/10  Pain Location:       3 most recent PHQ Screens 4/4/2022   PHQ Not Done -   Little interest or pleasure in doing things Several days   Feeling down, depressed, irritable, or hopeless Several days   Total Score PHQ 2 2   Trouble falling or staying asleep, or sleeping too much -   Feeling tired or having little energy -   Poor appetite, weight loss, or overeating -   Feeling bad about yourself - or that you are a failure or have let yourself or your family down -   Trouble concentrating on things such as school, work, reading, or watching TV -   Moving or speaking so slowly that other people could have noticed; or the opposite being so fidgety that others notice -   Thoughts of being better off dead, or hurting yourself in some way -   PHQ 9 Score -   How difficult have these problems made it for you to do your work, take care of your home and get along with others -     Learning Assessment 2/18/2022   PRIMARY LEARNER Patient   HIGHEST LEVEL OF EDUCATION - PRIMARY LEARNER  -   BARRIERS PRIMARY LEARNER -   CO-LEARNER CAREGIVER -   PRIMARY LANGUAGE ENGLISH   LEARNER PREFERENCE PRIMARY READING     -   ANSWERED BY patient   RELATIONSHIP SELF     Fall Risk Assessment, last 12 mths 4/4/2022   Able to walk? Yes   Fall in past 12 months? 0   Do you feel unsteady? 0   Are you worried about falling 0     Abuse Screening Questionnaire 4/4/2022   Do you ever feel afraid of your partner? N   Are you in a relationship with someone who physically or mentally threatens you? N   Is it safe for you to go home?  Chastity Webb 05/03/20  1018 05/02/20  0834    136 137 140   POTASSIUM 4.2 4.5 4.6 4.4   CHLORIDE 106 101 103 104   CALDERON 8.2* 8.5 8.9 8.3*   CO2 36* 34* 30 30   BUN 20 19 16 14   CR 0.57 0.59 0.62 0.57   GLC 94 96 104* 129*     INR  Recent Labs   Lab 05/02/20  0834   INR 0.84*      BNPNo lab results found in last 7 days.  VENOUS BLOOD GASES  Recent Labs   Lab 05/06/20  0532 05/04/20  0534 05/03/20  0358 05/02/20  0834   PHV 7.35 7.28* 7.32 7.35   PCO2V 72* 81* 64* 59*   PO2V 62* 43 49* 38   HCO3V 40* 38* 33* 32*   KYLER 11.3 7.2 4.2 4.6       Additional labs and/or comments:    IMAGING      CXR 5/2 -  IMPRESSION: Density at the right base is much improved, possibly  related to atelectasis. No new infiltrates.     CT Chest 5/2 -  IMPRESSION:  1. There is suboptimal opacification of the pulmonary arteries;  however, no pulmonary embolism is identified.  2. Emphysema.  3. Indeterminate 1.9 cm right lower lobe pulmonary nodule is new since  the previous exam, and is suspicious for primary or metastatic  malignancy.    PFT & OTHER TESTING       ASSESSMENT / PLAN      Pulmonary Diagnoses:  Abnl CT/CXR R91.8  COPD exacerb J44.1  Emphysema J43.9  Nicotine depend F17.210  Pulm nodule solit R91.1  Resp fail acute J96.00  SOB R06.02    Additional COVID-19 diagnoses:  Concern of possible exposure to COVID-19, Now RULED OUT Z03.818    ASSESSMENT: 69-year-old female smoker with a history of severe O2-dependent COPD, coronary artery disease admitted with shortness of breath secondary to a COPD exacerbation and NSTEMI.  COVID negative. Chest x-ray on admission showed hyperinflated lung fields with a mildly increased hazy density at the right lung base, left lung clear.  CT scan of the chest showed emphysema and a 1.9 cm right lower lobe pulmonary nodule, new versus prior exam 6/14/2017; there was no evidence for pneumonia.  Echocardiogram shows new regional wall motion abnormalities with an LVEF 35 to 40%.  Patient has been seen by cardiology  ADL Assessment 4/4/2022   Feeding yourself No Help Needed   Getting from bed to chair No Help Needed   Getting dressed No Help Needed   Bathing or showering No Help Needed   Walk across the room (includes cane/walker) No Help Needed   Using the telphone No Help Needed   Taking your medications No Help Needed   Preparing meals No Help Needed   Managing money (expenses/bills) No Help Needed   Moderately strenuous housework (laundry) No Help Needed   Shopping for personal items (toiletries/medicines) No Help Needed   Shopping for groceries No Help Needed   Driving No Help Needed   Climbing a flight of stairs No Help Needed   Getting to places beyond walking distances No Help Needed       1. \"Have you been to the ER, urgent care clinic since your last visit? Hospitalized since your last visit? \" No    2. \"Have you seen or consulted any other health care providers outside of the [address] [place] [address] [facility] since your last visit? \" No     3. For patients aged 39-70: Has the patient had a colonoscopy / FIT/ Cologuard? No      If the patient is female:    4. For patients aged 41-77: Has the patient had a mammogram within the past 2 years? No      5. For patients aged 21-65: Has the patient had a pap smear?  No          Symptom review:    NO  Fever   NO  Shaking chills  NO  Cough  NO  Body aches  NO  Coughing up blood  NO  Chest congestion  NO  Chest pain  NO  Shortness of breath  NO  Profound Loss of smell/taste  NO  Nausea/Vomiting   NO  Loose stool/Diarrhea  NO  any skin issues    Patient Risk Factors Reviewed as follows:  NO  have you been in Close contact with confirmed COVID19 patient   NO  History of recent travel to affected geographical areas within the past 14 days  NO  COPD  NO  Active Cancer/Leukemia/Lymphoma/Chemotherapy  NO  Oral steroid use  NO  Pregnant  NO  Diabetes Mellitus  NO  Heart disease  NO  Asthma  NO Health care worker at home  NO Health care worker  NO Is there a Pregnant Woman in the home  NO Dialysis pt in the home   NO a large number of people living in the home  Recent Travel Screening and Travel History documentation     Travel Screening     Question   Response    In the last 10 days, have you been in contact with someone who was confirmed or suspected to have Coronavirus/COVID-19? No / Unsure    Have you had a COVID-19 viral test in the last 10 days? No    Do you have any of the following new or worsening symptoms? None of these    Have you traveled internationally or domestically in the last month?   No      Travel History   Travel since 03/04/22    No documented travel since 03/04/22               Advance Care Planning 4/4/2022   Patient's Healthcare Decision Maker is: Legal Next of Kin   Confirm Advance Directive None   Patient Would Like to Complete Advance Directive No and is currently being managed medically.  Respiratory status has slowly improved with bronchodilators, antibiotics and steroids.  Right lower lobe pulmonary nodule concerning for malignancy, this will require further evaluation (likely PET scan and CT-guided needle biopsy versus bronchoscopy) at a later date once recovered from her current acute illness (and Plavix can be held). Respiratory status remains stable on low flow O2.    PLAN:  1. Bronchodilators - Breo 200/25, Incruse  2. Steroids - Prednisone taper.  3. Recommend outpatient Pulmonary follow-up for further evaluation of RLL pulmonary nodule [phone].    Estela Lopez M.D.  [facility]  Office: [phone]  Pager: [phone]

## 2022-04-05 LAB
ALBUMIN SERPL-MCNC: 3.8 G/DL (ref 3.5–5)
ALBUMIN/GLOB SERPL: 1.3 {RATIO} (ref 1.1–2.2)
ALP SERPL-CCNC: 82 U/L (ref 45–117)
ALT SERPL-CCNC: 12 U/L (ref 12–78)
ANION GAP SERPL CALC-SCNC: 6 MMOL/L (ref 5–15)
AST SERPL-CCNC: 10 U/L (ref 15–37)
BILIRUB SERPL-MCNC: 0.7 MG/DL (ref 0.2–1)
BUN SERPL-MCNC: 7 MG/DL (ref 6–20)
BUN/CREAT SERPL: 8 (ref 12–20)
CALCIUM SERPL-MCNC: 9.1 MG/DL (ref 8.5–10.1)
CHLORIDE SERPL-SCNC: 105 MMOL/L (ref 97–108)
CO2 SERPL-SCNC: 28 MMOL/L (ref 21–32)
CREAT SERPL-MCNC: 0.86 MG/DL (ref 0.55–1.02)
EST. AVERAGE GLUCOSE BLD GHB EST-MCNC: 146 MG/DL
GLOBULIN SER CALC-MCNC: 2.9 G/DL (ref 2–4)
GLUCOSE SERPL-MCNC: 175 MG/DL (ref 65–100)
HBA1C MFR BLD: 6.7 % (ref 4–5.6)
POTASSIUM SERPL-SCNC: 3.8 MMOL/L (ref 3.5–5.1)
PROT SERPL-MCNC: 6.7 G/DL (ref 6.4–8.2)
SODIUM SERPL-SCNC: 139 MMOL/L (ref 136–145)

## 2022-05-17 DIAGNOSIS — E11.9 TYPE 2 DIABETES MELLITUS WITHOUT COMPLICATION, WITHOUT LONG-TERM CURRENT USE OF INSULIN (HCC): ICD-10-CM

## 2022-05-17 RX ORDER — BLOOD SUGAR DIAGNOSTIC
STRIP MISCELLANEOUS
Qty: 100 STRIP | Refills: 5 | Status: SHIPPED | OUTPATIENT
Start: 2022-05-17

## 2022-05-17 RX ORDER — DAPAGLIFLOZIN 5 MG/1
TABLET, FILM COATED ORAL
Qty: 90 TABLET | Refills: 4 | Status: SHIPPED | OUTPATIENT
Start: 2022-05-17

## 2022-05-20 ENCOUNTER — OFFICE VISIT (OUTPATIENT)
Dept: FAMILY MEDICINE CLINIC | Age: 52
End: 2022-05-20
Payer: COMMERCIAL

## 2022-05-20 VITALS — HEART RATE: 106 BPM | TEMPERATURE: 97.1 F | OXYGEN SATURATION: 96 % | RESPIRATION RATE: 17 BRPM

## 2022-05-20 DIAGNOSIS — J01.90 ACUTE NON-RECURRENT SINUSITIS, UNSPECIFIED LOCATION: ICD-10-CM

## 2022-05-20 DIAGNOSIS — J02.9 SORE THROAT: Primary | ICD-10-CM

## 2022-05-20 DIAGNOSIS — R09.81 NASAL CONGESTION: ICD-10-CM

## 2022-05-20 LAB
S PYO AG THROAT QL: NEGATIVE
SARS-COV-2 POC: NEGATIVE
VALID INTERNAL CONTROL?: YES

## 2022-05-20 PROCEDURE — 87880 STREP A ASSAY W/OPTIC: CPT | Performed by: NURSE PRACTITIONER

## 2022-05-20 PROCEDURE — 99213 OFFICE O/P EST LOW 20 MIN: CPT | Performed by: NURSE PRACTITIONER

## 2022-05-20 PROCEDURE — 87426 SARSCOV CORONAVIRUS AG IA: CPT | Performed by: NURSE PRACTITIONER

## 2022-05-20 RX ORDER — AZITHROMYCIN 250 MG/1
TABLET, FILM COATED ORAL
Qty: 6 TABLET | Refills: 0 | Status: SHIPPED | OUTPATIENT
Start: 2022-05-20 | End: 2022-05-25

## 2022-05-20 RX ORDER — PREDNISONE 20 MG/1
20 TABLET ORAL
Qty: 5 TABLET | Refills: 0 | Status: SHIPPED | OUTPATIENT
Start: 2022-05-20 | End: 2022-05-25

## 2022-05-20 NOTE — PROGRESS NOTES
Brock Cox is a 50 y.o. female presenting for/with:    Chief Complaint   Patient presents with    Nasal Congestion     pt presents with congestion, sore throat , runny nose, chills x 5 days. Son was exposed at school to covid +. Visit Vitals  Pulse (!) 106   Temp 97.1 °F (36.2 °C) (Temporal)   Resp 17   SpO2 96%     Pain Scale: 0 - No pain/10  Pain Location:       3 most recent PHQ Screens 5/20/2022   PHQ Not Done -   Little interest or pleasure in doing things Several days   Feeling down, depressed, irritable, or hopeless Several days   Total Score PHQ 2 2   Trouble falling or staying asleep, or sleeping too much -   Feeling tired or having little energy -   Poor appetite, weight loss, or overeating -   Feeling bad about yourself - or that you are a failure or have let yourself or your family down -   Trouble concentrating on things such as school, work, reading, or watching TV -   Moving or speaking so slowly that other people could have noticed; or the opposite being so fidgety that others notice -   Thoughts of being better off dead, or hurting yourself in some way -   PHQ 9 Score -   How difficult have these problems made it for you to do your work, take care of your home and get along with others -     Learning Assessment 5/20/2022   PRIMARY LEARNER Patient   HIGHEST LEVEL OF EDUCATION - PRIMARY LEARNER  -   BARRIERS PRIMARY LEARNER -   CO-LEARNER CAREGIVER -   PRIMARY LANGUAGE ENGLISH   LEARNER PREFERENCE PRIMARY READING     -   ANSWERED BY pt   RELATIONSHIP SELF     Fall Risk Assessment, last 12 mths 5/20/2022   Able to walk? Yes   Fall in past 12 months? 0   Do you feel unsteady? 0   Are you worried about falling 0     Abuse Screening Questionnaire 5/20/2022   Do you ever feel afraid of your partner? N   Are you in a relationship with someone who physically or mentally threatens you? N   Is it safe for you to go home?  Y     ADL Assessment 5/20/2022   Feeding yourself No Help Needed   Getting from bed to chair No Help Needed   Getting dressed No Help Needed   Bathing or showering No Help Needed   Walk across the room (includes cane/walker) No Help Needed   Using the telphone No Help Needed   Taking your medications No Help Needed   Preparing meals No Help Needed   Managing money (expenses/bills) No Help Needed   Moderately strenuous housework (laundry) No Help Needed   Shopping for personal items (toiletries/medicines) No Help Needed   Shopping for groceries No Help Needed   Driving No Help Needed   Climbing a flight of stairs No Help Needed   Getting to places beyond walking distances No Help Needed       1. \"Have you been to the ER, urgent care clinic since your last visit? Hospitalized since your last visit? \" No    2. \"Have you seen or consulted any other health care providers outside of the [address] [place] [address] [facility] since your last visit? \" No     3. For patients aged 39-70: Has the patient had a colonoscopy / FIT/ Cologuard? No      If the patient is female:    4. For patients aged 41-77: Has the patient had a mammogram within the past 2 years? No      5. For patients aged 21-65: Has the patient had a pap smear?  No          Symptom review:    NO  Fever   YES  Shaking chills  YES  Cough  NO  Body aches  NO  Coughing up blood  YES  Chest congestion  NO  Chest pain  NO  Shortness of breath  NO  Profound Loss of smell/taste  NO  Nausea/Vomiting   NO  Loose stool/Diarrhea  NO  any skin issues    Patient Risk Factors Reviewed as follows:  YES  have you been in Close contact with confirmed COVID19 patient   NO  History of recent travel to affected geographical areas within the past 14 days  NO  COPD  NO  Active Cancer/Leukemia/Lymphoma/Chemotherapy  NO  Oral steroid use  NO  Pregnant  NO  Diabetes Mellitus  NO  Heart disease  NO  Asthma  NO Health care worker at home  [address] care worker  NO Is there a Pregnant Woman in the home  NO Dialysis pt in the home   NO a large number of people living in the home  Recent Travel Screening and Travel History documentation     Travel Screening     Question   Response    In the last 10 days, have you been in contact with someone who was confirmed or suspected to have Coronavirus/COVID-19? No / Unsure    Have you had a COVID-19 viral test in the last 10 days? No    Do you have any of the following new or worsening symptoms? None of these    Have you traveled internationally or domestically in the last month?   No      Travel History   Travel since 04/20/22    No documented travel since 04/20/22               Advance Care Planning 5/20/2022   Patient's Healthcare Decision Maker is: Legal Next of Kin   Confirm Advance Directive None   Patient Would Like to Complete Advance Directive No

## 2022-05-20 NOTE — PROGRESS NOTES
Chief Complaint   Patient presents with    Nasal Congestion     pt presents with congestion, sore throat , runny nose, chills x 5 days. Son was exposed at school to covid +. HPI:       is a 50 y.o. female. New Issues:  She is here for a [facility] sick visit. She has been sick for 5 days. Her son was exposed to someone who was COVID POS about 5 days ago. She is complaining of a severe sore throat, sinus congestion, hoarse voice and cough. Reports yellow sputum from her nose and in cough. No fever. No Known Allergies    Current Outpatient Medications   Medication Sig    Farxiga 5 mg tab tablet TAKE 1 TAB BY MOUTH DAILY. INDICATIONS: TYPE 2 DIABETES MELLITUS    glucose blood VI test strips (OneTouch Ultra Test) strip USE TO TEST GLUCOSE ONCE PER DAY. DX: E11.9.    venlafaxine-SR (EFFEXOR-XR) 37.5 mg capsule Take 1 Capsule by mouth daily.  ALPRAZolam (XANAX) 0.25 mg tablet Take 1 Tablet by mouth two (2) times daily as needed for Anxiety (Panic). Max Daily Amount: 0.5 mg.    Blood-Glucose Meter monitoring kit Use to test glucose once per day. Dx: E11.9.    metFORMIN (GLUCOPHAGE) 1,000 mg tablet TAKE 1 TABLET BY MOUTH EVERY DAY WITH BREAKFAST    iron aspgly,bu-D-S85-FA-Ca-suc (FERREX 150 FORTE PLUS) [id number] mg-mg-mcg-mg cap cap Take 1 Cap by mouth daily.  levonorgestrel (MIRENA) 20 mcg/24 hr (5 years) IUD 1 Each by IntraUTERine route once. No current facility-administered medications for this visit. Past Medical History:   Diagnosis Date    Anemia     Diabetes ([address]        No past surgical history on file. Social History     Socioeconomic History    Marital status: SINGLE   Tobacco Use    Smoking status: Never Smoker    Smokeless tobacco: Never Used   Substance and Sexual Activity    Alcohol use:  Yes    Drug use: No    Sexual activity: Yes       Family History   Problem Relation Age of Onset    Diabetes Mother     Diabetes Father        Above history reviewed. ROS:  Denies fever, chills, POS sore throat, POS cough, denies chest pain, SOB,  nausea, vomiting, or diarrhea. Denies wt loss, wt gain, hemoptysis, hematochezia or melena. Physical Examination:    Pulse (!) 106   Temp 97.1 °F (36.2 °C) (Temporal)   Resp 17   SpO2 96%     General: Alert and Ox3, Fluent speech, hoarse voice  HEENT:  PERRLA, EOM intact, TMs, turbinates, pharynx normal.  No thyromegaly. No cervical adenopathy. Neck:  Supple, no adenopathy, JVD, mass or bruit  Chest:  Clear to Ausculation, without wheezes, rales, rubs or ronchi  Cardiac: tachycardia   Extremities:  No cyanosis, clubbing or edema  Neurologic:  Ambulatory without assist, CN 2-12 grossly intact. Moves all extremities. Skin: no rash  Lymphadenopathy: no cervical or supraclavicular nodes    Results for orders placed or performed in visit on 05/20/22   AMB POC SARS-COV-2   Result Value Ref Range    SARS-COV-2 POC Negative Negative   AMB POC RAPID STREP A   Result Value Ref Range    VALID INTERNAL CONTROL POC Yes     Group A Strep Ag Negative Negative      ASSESSMENT AND PLAN:     1. Sore throat  Negative strep  - AMB POC SARS-COV-2  - AMB POC RAPID STREP A    2. Nasal congestion  Negative COVID   - AMB POC SARS-COV-2  - AMB POC RAPID STREP A    3. Acute non-recurrent sinusitis, unspecified location  Reviewed self care measures:  Fluids  Nasal Saline  Humidification + menthol petroleum (Vicks.)  Postural drainage  NSAID of choice PRN  Avoid decongestants, too drying and difficult to clear respiratory secretions. - azithromycin (ZITHROMAX) 250 mg tablet; Take 2 tablets today, then take 1 tablet daily  Dispense: 6 Tablet; Refill: 0  - predniSONE (DELTASONE) 20 mg tablet; Take 1 Tablet by mouth daily (with breakfast) for 5 days. Dispense: 5 Tablet;  Refill: 0     RTC PRN    Heather Real, KEENA

## 2022-07-05 ENCOUNTER — OFFICE VISIT (OUTPATIENT)
Dept: FAMILY MEDICINE CLINIC | Age: 52
End: 2022-07-05
Payer: COMMERCIAL

## 2022-07-05 ENCOUNTER — NURSE TRIAGE (OUTPATIENT)
Dept: OTHER | Facility: CLINIC | Age: 52
End: 2022-07-05

## 2022-07-05 VITALS
SYSTOLIC BLOOD PRESSURE: 110 MMHG | HEART RATE: 108 BPM | DIASTOLIC BLOOD PRESSURE: 62 MMHG | TEMPERATURE: 98.6 F | BODY MASS INDEX: 20.79 KG/M2 | RESPIRATION RATE: 19 BRPM | OXYGEN SATURATION: 98 % | WEIGHT: 121.8 LBS | HEIGHT: 64 IN

## 2022-07-05 DIAGNOSIS — Z12.11 SCREENING FOR MALIGNANT NEOPLASM OF COLON: ICD-10-CM

## 2022-07-05 DIAGNOSIS — F43.21 SITUATIONAL DEPRESSION: ICD-10-CM

## 2022-07-05 DIAGNOSIS — R13.19 ESOPHAGEAL DYSPHAGIA: ICD-10-CM

## 2022-07-05 DIAGNOSIS — R55 SYNCOPE, UNSPECIFIED SYNCOPE TYPE: Primary | ICD-10-CM

## 2022-07-05 DIAGNOSIS — E11.9 TYPE 2 DIABETES MELLITUS WITHOUT COMPLICATION, WITHOUT LONG-TERM CURRENT USE OF INSULIN (HCC): ICD-10-CM

## 2022-07-05 PROCEDURE — 3044F HG A1C LEVEL LT 7.0%: CPT | Performed by: NURSE PRACTITIONER

## 2022-07-05 PROCEDURE — 99214 OFFICE O/P EST MOD 30 MIN: CPT | Performed by: NURSE PRACTITIONER

## 2022-07-05 PROCEDURE — 93000 ELECTROCARDIOGRAM COMPLETE: CPT | Performed by: NURSE PRACTITIONER

## 2022-07-05 NOTE — PROGRESS NOTES
Chief Complaint   Patient presents with    Dizziness     Patient passed out on saturday and hit the floor. she is still light headed         HPI:       is a 50 y.o. female. [employer] employee in [place].      Diabetes: Metformin 1,000 mg and Farxiga. No adverse effects. Last A1C 6.7%     Iron deficiency: Doing well on iron.  Dose is spaced to 3 days per week.     Anxiety: Her parents both passed in the past 2 years. She lives in their family home. She has had feelings of panic that are worse when she is getting ready to go to work. She was prescribed both Wellbutrin and Effexor this year. 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 She had a similar episode in 2019 when her mom first got sick. She adamantly denies SI. She did some talk therapy with Leeroy Bhatti LCSW. New Issues:  She was spending time with family on Saturday. She reports that she did not eat that day and the house was hot. She reports that she became suddenly lightheaded and passed out. Witnesses report that she kind of just collapsed. Did not hit head. No injury. Did not check her BG. She denies new/worsening stressors. She did stop her Effexor on her own. She has not been eating as she should and reports she only takes her diabetic medications 3-4 days per week. BG has been running 130-170 when she checked it. She has lost weight. She reports that this is now un-intentional.  She has been having trouble swallowing some of her pills. Seem to get stuck in her throat. She remembers that both of her parents had to have esophageal dilations. No Known Allergies    Current Outpatient Medications   Medication Sig    Farxiga 5 mg tab tablet TAKE 1 TAB BY MOUTH DAILY. INDICATIONS: TYPE 2 DIABETES MELLITUS    glucose blood VI test strips (OneTouch Ultra Test) strip USE TO TEST GLUCOSE ONCE PER DAY. DX: E11.9.    venlafaxine-SR (EFFEXOR-XR) 37.5 mg capsule Take 1 Capsule by mouth daily.     ALPRAZolam (XANAX) 0.25 mg tablet Take 1 Tablet by mouth two (2) times daily as needed for Anxiety (Panic). Max Daily Amount: 0.5 mg.    Blood-Glucose Meter monitoring kit Use to test glucose once per day. Dx: E11.9.    metFORMIN (GLUCOPHAGE) 1,000 mg tablet TAKE 1 TABLET BY MOUTH EVERY DAY WITH BREAKFAST    iron aspgly,kt-T-F42-FA-Ca-suc (FERREX 150 FORTE PLUS) [id number] mg-mg-mcg-mg cap cap Take 1 Cap by mouth daily.  levonorgestrel (MIRENA) 20 mcg/24 hr (5 years) IUD 1 Each by IntraUTERine route once. No current facility-administered medications for this visit. Past Medical History:   Diagnosis Date    Anemia     Diabetes ([address]        No past surgical history on file. Social History     Socioeconomic History    Marital status: SINGLE   Tobacco Use    Smoking status: Never Smoker    Smokeless tobacco: Never Used   Substance and Sexual Activity    Alcohol use: Yes    Drug use: No    Sexual activity: Yes       Family History   Problem Relation Age of Onset    Diabetes Mother     Diabetes Father        Above history reviewed. ROS:  Denies fever, chills, cough, chest pain, SOB,  nausea, vomiting, or diarrhea. Denies wt loss, wt gain, hemoptysis, hematochezia or melena. Physical Examination:    /62 (BP 1 Location: Left upper arm, BP Patient Position: Sitting, BP Cuff Size: Adult long)   Pulse (!) 108   Temp 98.6 °F (37 °C) (Temporal)   Resp 19   Ht 5' 4\" (1.626 m)   Wt 121 lb 12.8 oz (55.2 kg)   SpO2 98%   BMI 20.91 kg/m²     General: Alert and Ox3, Fluent speech  HEENT:  PERRLA, EOM intact, TMs, turbinates, pharynx normal.  No thyromegaly. No cervical adenopathy. Neck:  Supple, no adenopathy, JVD, mass or bruit  Chest:  Clear to Ausculation, without wheezes, rales, rubs or ronchi  Cardiac: RRR  Abdomen:  +BS, soft, nontender without palpable HSM  Extremities:  No cyanosis, clubbing or edema  Neurologic:  Ambulatory without assist, CN 2-12 grossly intact. Moves all extremities.   Skin: no rash  Lymphadenopathy: no cervical or supraclavicular nodes    EKG: sinus rhythm, no previous EKGs to compare. ASSESSMENT AND PLAN:     1. Syncope, unspecified syncope type  Possible vasovagal  No acute changes on EKG  - AMB POC EKG ROUTINE W/ 12 LEADS, INTER & REP    2. Type 2 diabetes mellitus without complication, without long-term current use of insulin ([address]  Possible hypoglycemic episode  Educated on the importance of eating regular meals   - HEMOGLOBIN A1C WITH EAG; Future  - TSH 3RD GENERATION; Future  - CBC WITH AUTOMATED DIFF; Future  - LIPID PANEL; Future  - METABOLIC PANEL, COMPREHENSIVE; Future  - METABOLIC PANEL, COMPREHENSIVE  - LIPID PANEL  - CBC WITH AUTOMATED DIFF  - TSH 3RD GENERATION  - HEMOGLOBIN A1C WITH EAG    3. Situational depression  Patient has stopped medications on her own  Not interested in restarting at this time     4.  Screening for malignant neoplasm of colon  - REFERRAL TO GENERAL SURGERY    5. Esophageal dysphagia  - REFERRAL TO GENERAL SURGERY    RTC in 3 months    [address], [place]

## 2022-07-05 NOTE — TELEPHONE ENCOUNTER
Received call from Haroon Restrepo at [facility] with Red Flag Complaint. Subjective: Caller states \"Fainted\"     Current Symptoms:   Patient reports she fainted on Saturday briefly. She states she was standing up talking and then fell onto the carpeted floor. Denies injury. Patient states her fasting blood glucose was a little elevated earlier that day at 170. Patient is now alert and oriented x 4 and reports she is feeling fine. Onset: 4 days ago    Pain Severity: NA    Temperature: Denies    Recommended disposition: See in Office Today    Care advice provided, patient verbalizes understanding; denies any other questions or concerns; instructed to call back for any new or worsening symptoms. Patient/Caller agrees with recommended disposition; writer provided warm transfer to Ha Callahan at [facility] for appointment scheduling    Attention Provider: Thank you for allowing me to participate in the care of your patient. The patient was connected to triage in response to information provided to the [facility]. Please do not respond through this encounter as the response is not directed to a shared pool.     Reason for Disposition   All other patients, and now alert and feels fine (Exception: SIMPLE FAINT due to stress, pain, prolonged standing, or suddenly standing)    Protocols used: FAINTING-ADULT-[place]

## 2022-07-05 NOTE — PROGRESS NOTES
Sabrina Davis is a 50 y.o. female presenting for/with:    Chief Complaint   Patient presents with    Dizziness     Patient passed out on saturday and hit the floor. she is still light headed       Visit Vitals  /62 (BP 1 Location: Left upper arm, BP Patient Position: Sitting, BP Cuff Size: Adult long)   Pulse (!) 108   Temp 98.6 °F (37 °C) (Temporal)   Resp 19   Ht 5' 4\" (1.626 m)   Wt 121 lb 12.8 oz (55.2 kg)   SpO2 98%   BMI 20.91 kg/m²     Pain Scale: 0 - No pain/10  Pain Location:     1. \"Have you been to the ER, urgent care clinic since your last visit? Hospitalized since your last visit? \" No    2. \"Have you seen or consulted any other health care providers outside of the [address] [place] [address] since your last visit? \" No     3. For patients aged 39-70: Has the patient had a colonoscopy / FIT/ Cologuard? No      If the patient is female:    4. For patients aged 41-77: Has the patient had a mammogram within the past 2 years? No      5. For patients aged 21-65: Has the patient had a pap smear?  No      Symptom review:  NO  Fever   NO  Shaking chills  NO  Cough  NO  Body aches  NO  Coughing up blood  NO  Chest congestion  NO  Chest pain  NO  Shortness of breath  NO  Profound Loss of smell/taste  NO  Nausea/Vomiting   NO  Loose stool/Diarrhea  NO  any skin issues    Patient Risk Factors Reviewed as follows:  NO  have you been in Close contact with confirmed COVID19 patient   NO  History of recent travel to affected geographical areas within the past 14 days  NO  COPD  NO  Active Cancer/Leukemia/Lymphoma/Chemotherapy  NO  Oral steroid use  NO  Pregnant  Yes  Diabetes Mellitus  NO  Heart disease  NO  Asthma  NO Health care worker at home  NO Health care worker  NO Is there a Pregnant Woman in the home  NO Dialysis pt in the home   NO a large number of people living in the home    Learning Assessment 5/20/2022   PRIMARY LEARNER Patient   HIGHEST LEVEL OF EDUCATION - PRIMARY LEARNER  -   BARRIERS PRIMARY LEARNER -   CO-LEARNER CAREGIVER -   PRIMARY LANGUAGE ENGLISH   LEARNER PREFERENCE PRIMARY READING     -   ANSWERED BY pt   RELATIONSHIP SELF     Fall Risk Assessment, last 12 mths 7/5/2022   Able to walk? Yes   Fall in past 12 months? 0   Do you feel unsteady? 0   Are you worried about falling 0       3 most recent PHQ Screens 7/5/2022   PHQ Not Done -   Little interest or pleasure in doing things Not at all   Feeling down, depressed, irritable, or hopeless Not at all   Total Score PHQ 2 0   Trouble falling or staying asleep, or sleeping too much -   Feeling tired or having little energy -   Poor appetite, weight loss, or overeating -   Feeling bad about yourself - or that you are a failure or have let yourself or your family down -   Trouble concentrating on things such as school, work, reading, or watching TV -   Moving or speaking so slowly that other people could have noticed; or the opposite being so fidgety that others notice -   Thoughts of being better off dead, or hurting yourself in some way -   PHQ 9 Score -   How difficult have these problems made it for you to do your work, take care of your home and get along with others -     Abuse Screening Questionnaire 7/5/2022   Do you ever feel afraid of your partner? N   Are you in a relationship with someone who physically or mentally threatens you? N   Is it safe for you to go home?  Y       ADL Assessment 7/5/2022   Feeding yourself No Help Needed   Getting from bed to chair No Help Needed   Getting dressed No Help Needed   Bathing or showering No Help Needed   Walk across the room (includes cane/walker) No Help Needed   Using the telphone No Help Needed   Taking your medications No Help Needed   Preparing meals No Help Needed   Managing money (expenses/bills) No Help Needed   Moderately strenuous housework (laundry) No Help Needed   Shopping for personal items (toiletries/medicines) No Help Needed   Shopping for groceries No Help Needed   Driving No Help Needed   Climbing a flight of stairs No Help Needed   Getting to places beyond walking distances No Help Needed      Advance Care Planning 5/20/2022   Patient's Healthcare Decision Maker is: Legal Next of Kin   Confirm Advance Directive None   Patient Would Like to Complete Advance Directive No

## 2022-07-06 LAB
ALBUMIN SERPL-MCNC: 4 G/DL (ref 3.5–5)
ALBUMIN/GLOB SERPL: 1.2 {RATIO} (ref 1.1–2.2)
ALP SERPL-CCNC: 82 U/L (ref 45–117)
ALT SERPL-CCNC: 11 U/L (ref 12–78)
ANION GAP SERPL CALC-SCNC: 5 MMOL/L (ref 5–15)
AST SERPL-CCNC: 10 U/L (ref 15–37)
BASOPHILS # BLD: 0 K/UL (ref 0–0.1)
BASOPHILS NFR BLD: 0 % (ref 0–1)
BILIRUB SERPL-MCNC: 0.9 MG/DL (ref 0.2–1)
BUN SERPL-MCNC: 8 MG/DL (ref 6–20)
BUN/CREAT SERPL: 9 (ref 12–20)
CALCIUM SERPL-MCNC: 9.2 MG/DL (ref 8.5–10.1)
CHLORIDE SERPL-SCNC: 106 MMOL/L (ref 97–108)
CHOLEST SERPL-MCNC: 166 MG/DL
CO2 SERPL-SCNC: 29 MMOL/L (ref 21–32)
CREAT SERPL-MCNC: 0.94 MG/DL (ref 0.55–1.02)
DIFFERENTIAL METHOD BLD: ABNORMAL
EOSINOPHIL # BLD: 0 K/UL (ref 0–0.4)
EOSINOPHIL NFR BLD: 1 % (ref 0–7)
ERYTHROCYTE [DISTWIDTH] IN BLOOD BY AUTOMATED COUNT: 13.4 % (ref 11.5–14.5)
EST. AVERAGE GLUCOSE BLD GHB EST-MCNC: 143 MG/DL
GLOBULIN SER CALC-MCNC: 3.4 G/DL (ref 2–4)
GLUCOSE SERPL-MCNC: 125 MG/DL (ref 65–100)
HBA1C MFR BLD: 6.6 % (ref 4–5.6)
HCT VFR BLD AUTO: 38.3 % (ref 35–47)
HDLC SERPL-MCNC: 60 MG/DL
HDLC SERPL: 2.8 {RATIO} (ref 0–5)
HGB BLD-MCNC: 12 G/DL (ref 11.5–16)
IMM GRANULOCYTES # BLD AUTO: 0 K/UL (ref 0–0.04)
IMM GRANULOCYTES NFR BLD AUTO: 0 % (ref 0–0.5)
LDLC SERPL CALC-MCNC: 99 MG/DL (ref 0–100)
LYMPHOCYTES # BLD: 1.6 K/UL (ref 0.8–3.5)
LYMPHOCYTES NFR BLD: 45 % (ref 12–49)
MCH RBC QN AUTO: 27.9 PG (ref 26–34)
MCHC RBC AUTO-ENTMCNC: 31.3 G/DL (ref 30–36.5)
MCV RBC AUTO: 89.1 FL (ref 80–99)
MONOCYTES # BLD: 0.2 K/UL (ref 0–1)
MONOCYTES NFR BLD: 7 % (ref 5–13)
NEUTS SEG # BLD: 1.6 K/UL (ref 1.8–8)
NEUTS SEG NFR BLD: 47 % (ref 32–75)
NRBC # BLD: 0 K/UL (ref 0–0.01)
NRBC BLD-RTO: 0 PER 100 WBC
PLATELET # BLD AUTO: 332 K/UL (ref 150–400)
PMV BLD AUTO: 10.1 FL (ref 8.9–12.9)
POTASSIUM SERPL-SCNC: 3.6 MMOL/L (ref 3.5–5.1)
PROT SERPL-MCNC: 7.4 G/DL (ref 6.4–8.2)
RBC # BLD AUTO: 4.3 M/UL (ref 3.8–5.2)
SODIUM SERPL-SCNC: 140 MMOL/L (ref 136–145)
TRIGL SERPL-MCNC: 35 MG/DL (ref ?–150)
TSH SERPL DL<=0.05 MIU/L-ACNC: 1.18 UIU/ML (ref 0.36–3.74)
VLDLC SERPL CALC-MCNC: 7 MG/DL
WBC # BLD AUTO: 3.5 K/UL (ref 3.6–11)

## 2022-07-08 DIAGNOSIS — E11.9 TYPE 2 DIABETES MELLITUS WITHOUT COMPLICATION, WITHOUT LONG-TERM CURRENT USE OF INSULIN (HCC): ICD-10-CM

## 2022-07-08 NOTE — PROGRESS NOTES
Pt notified of results. She states she missed work this past wed, thurs and Friday because she didn't want to drive until she rec'd okay results. Are you okay with doing a note?

## 2022-07-08 NOTE — PROGRESS NOTES
Kidneys and liver are good. Cholesterol is good. Thyroid is good. A1c is doing very well at 6.6%. I am happy with this despite you skipping some doses. Continue to work on eating balanced meals. If continuing to lose weight, we will have to stop [place].

## 2022-09-19 ENCOUNTER — OFFICE VISIT (OUTPATIENT)
Dept: FAMILY MEDICINE CLINIC | Age: 52
End: 2022-09-19
Payer: COMMERCIAL

## 2022-09-19 VITALS
RESPIRATION RATE: 18 BRPM | TEMPERATURE: 98.4 F | WEIGHT: 121.4 LBS | HEART RATE: 97 BPM | SYSTOLIC BLOOD PRESSURE: 122 MMHG | OXYGEN SATURATION: 98 % | BODY MASS INDEX: 20.73 KG/M2 | DIASTOLIC BLOOD PRESSURE: 70 MMHG | HEIGHT: 64 IN

## 2022-09-19 DIAGNOSIS — F43.21 SITUATIONAL DEPRESSION: Primary | ICD-10-CM

## 2022-09-19 DIAGNOSIS — H00.011 HORDEOLUM EXTERNUM OF RIGHT UPPER EYELID: ICD-10-CM

## 2022-09-19 PROCEDURE — 99214 OFFICE O/P EST MOD 30 MIN: CPT | Performed by: NURSE PRACTITIONER

## 2022-09-19 RX ORDER — VENLAFAXINE HYDROCHLORIDE 37.5 MG/1
37.5 CAPSULE, EXTENDED RELEASE ORAL DAILY
Qty: 90 CAPSULE | Refills: 4 | Status: SHIPPED | OUTPATIENT
Start: 2022-09-19

## 2022-09-19 NOTE — LETTER
NOTIFICATION RETURN TO WORK / SCHOOL    9/19/2022 4:16 PM    Ms. Guerita Ang  [address] [id number]      To Whom It May Concern:    Guerita Ang is currently under the care of Dimas Smith. She missed work on 9/12, 9/13 and 9/19 for illness. She will return to work/school on: 9/20/22    If there are questions or concerns please have the patient contact our office.         Sincerely,      Arian Dominique NP

## 2022-09-19 NOTE — PROGRESS NOTES
Halie Martinez is a 50 y.o. female presenting for/with:    No chief complaint on file. There were no vitals taken for this visit. Pain Scale: /10  Pain Location:     1. \"Have you been to the ER, urgent care clinic since your last visit? Hospitalized since your last visit? \" No    2. \"Have you seen or consulted any other health care providers outside of the [address] [place] [address] since your last visit? \" No     3. For patients aged 39-70: Has the patient had a colonoscopy / FIT/ Cologuard? No      If the patient is female:    4. For patients aged 41-77: Has the patient had a mammogram within the past 2 years? No      5. For patients aged 21-65: Has the patient had a pap smear? No          Patient    Learning Assessment 5/20/2022   PRIMARY LEARNER Patient   HIGHEST LEVEL OF EDUCATION - PRIMARY LEARNER  -   BARRIERS PRIMARY LEARNER -   CO-LEARNER CAREGIVER -   PRIMARY LANGUAGE ENGLISH   LEARNER PREFERENCE PRIMARY READING     -   ANSWERED BY pt   RELATIONSHIP SELF     Fall Risk Assessment, last 12 mths 7/5/2022   Able to walk? Yes   Fall in past 12 months? 0   Do you feel unsteady?  0   Are you worried about falling 0       3 most recent PHQ Screens 9/19/2022   PHQ Not Done -   Little interest or pleasure in doing things Not at all   Feeling down, depressed, irritable, or hopeless Not at all   Total Score PHQ 2 0   Trouble falling or staying asleep, or sleeping too much -   Feeling tired or having little energy -   Poor appetite, weight loss, or overeating -   Feeling bad about yourself - or that you are a failure or have let yourself or your family down -   Trouble concentrating on things such as school, work, reading, or watching TV -   Moving or speaking so slowly that other people could have noticed; or the opposite being so fidgety that others notice -   Thoughts of being better off dead, or hurting yourself in some way -   PHQ 9 Score -   How difficult have these problems made it for you to do your work, take care of your home and get along with others -     Abuse Screening Questionnaire 9/19/2022   Do you ever feel afraid of your partner? N   Are you in a relationship with someone who physically or mentally threatens you? N   Is it safe for you to go home?  Y       ADL Assessment 9/19/2022   Feeding yourself No Help Needed   Getting from bed to chair No Help Needed   Getting dressed No Help Needed   Bathing or showering No Help Needed   Walk across the room (includes cane/walker) No Help Needed   Using the telphone No Help Needed   Taking your medications No Help Needed   Preparing meals No Help Needed   Managing money (expenses/bills) No Help Needed   Moderately strenuous housework (laundry) No Help Needed   Shopping for personal items (toiletries/medicines) No Help Needed   Shopping for groceries No Help Needed   Driving No Help Needed   Climbing a flight of stairs No Help Needed   Getting to places beyond walking distances No Help Needed      Advance Care Planning 5/20/2022   Patient's Healthcare Decision Maker is: Legal Next of Kin   Confirm Advance Directive None   Patient Would Like to Complete Advance Directive No

## 2022-09-19 NOTE — PROGRESS NOTES
Chief Complaint   Patient presents with    Anxiety     Feeling more anxious lately . Dwaine Garner not currently on medication . . feels anxious more in the mornings     Headache     Off and on for about 1 month . . mainly frontal headaches . .. usually uses Tylenol for pain     Stye     R eye x 2 weeks . . feels like its getting smaller          HPI:       is a 50 y.o. female. [employer] employee in [place]. Diabetes: Metformin 1,000 mg and Farxiga. No adverse effects. Last A1C 6.6%     Iron deficiency: Doing well on iron. Dose is spaced to 3 days per week. Anxiety: Her parents both passed in the past 2 years. She lives in their family home. She has had feelings of panic that are worse when she is getting ready to go to work. She was prescribed both Wellbutrin and Effexor this year. Lien commutes 3 hours each way to work. She had a similar episode in 2019 when her mom first got sick. She adamantly denies SI. She did some talk therapy with Ngoc Narayanan LCSW. New Issues:  Her work schedule has changed and she is now commuting 5 days per week. This has worsened her anxiety. She stopped her Effexor on her own a few months ago. She has a painful bump on her right upper eyelid. Improving. She has been doing warm compresses. No Known Allergies    Current Outpatient Medications   Medication Sig    Farxiga 5 mg tab tablet TAKE 1 TAB BY MOUTH DAILY. INDICATIONS: TYPE 2 DIABETES MELLITUS    glucose blood VI test strips (OneTouch Ultra Test) strip USE TO TEST GLUCOSE ONCE PER DAY. DX: E11.9. Blood-Glucose Meter monitoring kit Use to test glucose once per day. Dx: E11.9.    metFORMIN (GLUCOPHAGE) 1,000 mg tablet TAKE 1 TABLET BY MOUTH EVERY DAY WITH BREAKFAST    iron aspgly,[id number]-Ca-suc (FERREX 150 FORTE PLUS) 124-70-94-6 mg-mg-mcg-mg cap cap Take 1 Cap by mouth daily. levonorgestreL (MIRENA) 20 mcg/24 hours (8 yrs) 52 mg IUD 1 Each by IntraUTERine route once.      No current facility-administered medications for this visit. Past Medical History:   Diagnosis Date    Anemia     Diabetes ([address]        History reviewed. No pertinent surgical history. Social History     Socioeconomic History    Marital status: SINGLE   Tobacco Use    Smoking status: Never    Smokeless tobacco: Never   Vaping Use    Vaping Use: Never used   Substance and Sexual Activity    Alcohol use: Yes    Drug use: No    Sexual activity: Yes       Family History   Problem Relation Age of Onset    Diabetes Mother     Diabetes Father        Above history reviewed. ROS:  Denies fever, chills, cough, chest pain, SOB,  nausea, vomiting, or diarrhea. Denies wt loss, wt gain, hemoptysis, hematochezia or melena. Physical Examination:    /70 (BP 1 Location: Left arm, BP Patient Position: Sitting)   Pulse 97   Temp 98.4 °F (36.9 °C) (Temporal)   Resp 18   Ht 5' 4\" (1.626 m)   Wt 121 lb 6.4 oz (55.1 kg)   SpO2 98%   BMI 20.84 kg/m²     General: Alert and Ox3, Fluent speech,   HEENT:  PERRLA, EOM intact, right upper eye with internal and external hordeolum with minimal erythema, TMs, turbinates, pharynx normal.  No thyromegaly. No cervical adenopathy. Neck:  Supple, no adenopathy, JVD, mass or bruit  Chest:  Clear to Ausculation, without wheezes, rales, rubs or ronchi  Cardiac: RRR  Extremities:  No cyanosis, clubbing or edema  Neurologic:  Ambulatory without assist, CN 2-12 grossly intact. Moves all extremities. Skin: no rash  Lymphadenopathy: no cervical or supraclavicular nodes    ASSESSMENT AND PLAN:     1. Situational depression  Worsening  Restart Effexor  Recommend working on work-life balance  - venlafaxine-SR (EFFEXOR-XR) 37.5 mg capsule; Take 1 Capsule by mouth daily. Dispense: 90 Capsule; Refill: 4    2.  Hordeolum externum of right upper eyelid  Warm compresses for 15 minutes every 3-4 hours     RTC in October for diabetes and anxiety check    Joelle Kurtz NP

## 2022-10-07 ENCOUNTER — OFFICE VISIT (OUTPATIENT)
Dept: FAMILY MEDICINE CLINIC | Age: 52
End: 2022-10-07
Payer: COMMERCIAL

## 2022-10-07 VITALS
HEIGHT: 64 IN | WEIGHT: 118 LBS | OXYGEN SATURATION: 96 % | RESPIRATION RATE: 18 BRPM | BODY MASS INDEX: 20.14 KG/M2 | HEART RATE: 100 BPM | DIASTOLIC BLOOD PRESSURE: 68 MMHG | SYSTOLIC BLOOD PRESSURE: 110 MMHG | TEMPERATURE: 98.6 F

## 2022-10-07 DIAGNOSIS — F41.9 ANXIETY AND DEPRESSION: Primary | ICD-10-CM

## 2022-10-07 DIAGNOSIS — F32.A ANXIETY AND DEPRESSION: Primary | ICD-10-CM

## 2022-10-07 DIAGNOSIS — Z23 ENCOUNTER FOR IMMUNIZATION: ICD-10-CM

## 2022-10-07 PROCEDURE — 90471 IMMUNIZATION ADMIN: CPT | Performed by: NURSE PRACTITIONER

## 2022-10-07 PROCEDURE — 99214 OFFICE O/P EST MOD 30 MIN: CPT | Performed by: NURSE PRACTITIONER

## 2022-10-07 PROCEDURE — 90686 IIV4 VACC NO PRSV 0.5 ML IM: CPT | Performed by: NURSE PRACTITIONER

## 2022-10-07 NOTE — PROGRESS NOTES
Kings Dunlap is a 50 y.o. female presenting for/with:    Chief Complaint   Patient presents with    Other     Needs FMLA paperwork for missing work the past 2 weeks due to anxiety        There were no vitals taken for this visit. Pain Scale: /10  Pain Location:     1. \"Have you been to the ER, urgent care clinic since your last visit? Hospitalized since your last visit? \" No    2. \"Have you seen or consulted any other health care providers outside of the [address] [place] [address] since your last visit? \" No     3. For patients aged 39-70: Has the patient had a colonoscopy / FIT/ Cologuard? No      If the patient is female:    4. For patients aged 41-77: Has the patient had a mammogram within the past 2 years? No      5. For patients aged 21-65: Has the patient had a pap smear? No          Patient    Learning Assessment 5/20/2022   PRIMARY LEARNER Patient   HIGHEST LEVEL OF EDUCATION - PRIMARY LEARNER  -   BARRIERS PRIMARY LEARNER -   CO-LEARNER CAREGIVER -   PRIMARY LANGUAGE ENGLISH   LEARNER PREFERENCE PRIMARY READING     -   ANSWERED BY pt   RELATIONSHIP SELF     Fall Risk Assessment, last 12 mths 7/5/2022   Able to walk? Yes   Fall in past 12 months? 0   Do you feel unsteady?  0   Are you worried about falling 0       3 most recent PHQ Screens 10/7/2022   PHQ Not Done -   Little interest or pleasure in doing things Several days   Feeling down, depressed, irritable, or hopeless Several days   Total Score PHQ 2 2   Trouble falling or staying asleep, or sleeping too much -   Feeling tired or having little energy -   Poor appetite, weight loss, or overeating -   Feeling bad about yourself - or that you are a failure or have let yourself or your family down -   Trouble concentrating on things such as school, work, reading, or watching TV -   Moving or speaking so slowly that other people could have noticed; or the opposite being so fidgety that others notice -   Thoughts of being better off dead, or hurting yourself in some way -   PHQ 9 Score -   How difficult have these problems made it for you to do your work, take care of your home and get along with others -     Abuse Screening Questionnaire 9/19/2022   Do you ever feel afraid of your partner? N   Are you in a relationship with someone who physically or mentally threatens you? N   Is it safe for you to go home?  Y       ADL Assessment 9/19/2022   Feeding yourself No Help Needed   Getting from bed to chair No Help Needed   Getting dressed No Help Needed   Bathing or showering No Help Needed   Walk across the room (includes cane/walker) No Help Needed   Using the telphone No Help Needed   Taking your medications No Help Needed   Preparing meals No Help Needed   Managing money (expenses/bills) No Help Needed   Moderately strenuous housework (laundry) No Help Needed   Shopping for personal items (toiletries/medicines) No Help Needed   Shopping for groceries No Help Needed   Driving No Help Needed   Climbing a flight of stairs No Help Needed   Getting to places beyond walking distances No Help Needed      Advance Care Planning 5/20/2022   Patient's Healthcare Decision Maker is: Legal Next of Kin   Confirm Advance Directive None   Patient Would Like to Complete Advance Directive No

## 2022-10-07 NOTE — PROGRESS NOTES
Chief Complaint   Patient presents with    Other     Needs [place] paperwork for missing work the past 2 weeks due to anxiety          HPI:       is a 50 y.o. female. [employer] employee in [place]. Diabetes: Metformin 1,000 mg and Farxiga. No adverse effects. Last A1C 6.6%     Iron deficiency: Doing well on iron. Dose is spaced to 3 days per week. Anxiety: Her parents both passed. She lives in their family home. She has had feelings of panic that are worse when she is getting ready to go to work. Lien commutes 3 hours each way to work. She had a similar episode in 2019 when her mom first got sick. She adamantly denies SI. She did some talk therapy with Aurora Padilla LCSW. New Issues:  She has been having more issues with depression and anxiety. She has been out of work since 9/26/22. She has been talking to her EAP at work who has asked her to come in and get evaluated. She has been taking her Effexor for 4 days. She has a plan to get back in with DOROTEO Sierra, for talk therapy. No Known Allergies    Current Outpatient Medications   Medication Sig    venlafaxine-SR (EFFEXOR-XR) 37.5 mg capsule Take 1 Capsule by mouth daily. Farxiga 5 mg tab tablet TAKE 1 TAB BY MOUTH DAILY. INDICATIONS: TYPE 2 DIABETES MELLITUS    glucose blood VI test strips (OneTouch Ultra Test) strip USE TO TEST GLUCOSE ONCE PER DAY. DX: E11.9. Blood-Glucose Meter monitoring kit Use to test glucose once per day. Dx: E11.9.    metFORMIN (GLUCOPHAGE) 1,000 mg tablet TAKE 1 TABLET BY MOUTH EVERY DAY WITH BREAKFAST    iron aspgly,[id number]-Ca-suc (FERREX 150 FORTE PLUS) 522-78-01-8 mg-mg-mcg-mg cap cap Take 1 Cap by mouth daily. levonorgestreL (MIRENA) 20 mcg/24 hours (8 yrs) 52 mg IUD 1 Each by IntraUTERine route once. No current facility-administered medications for this visit. Past Medical History:   Diagnosis Date    Anemia     Diabetes ([address]        History reviewed.  No pertinent surgical history. Social History     Socioeconomic History    Marital status: SINGLE   Tobacco Use    Smoking status: Never    Smokeless tobacco: Never   Vaping Use    Vaping Use: Never used   Substance and Sexual Activity    Alcohol use: Yes    Drug use: No    Sexual activity: Yes       Family History   Problem Relation Age of Onset    Diabetes Mother     Diabetes Father        Above history reviewed. ROS:  Denies fever, chills, cough, chest pain, SOB,  nausea, vomiting, or diarrhea. Denies wt loss, wt gain, hemoptysis, hematochezia or melena. Physical Examination:    /68 (BP 1 Location: Left arm, BP Patient Position: Sitting)   Pulse 100   Temp 98.6 °F (37 °C) (Temporal)   Resp 18   Ht 5' 4\" (1.626 m)   Wt 118 lb (53.5 kg)   SpO2 96%   BMI 20.25 kg/m²     General: Alert and Ox3, Fluent speech, thin  HEENT:  PERRLA, EOM intact, TMs, turbinates, pharynx normal.  No thyromegaly. No cervical adenopathy. Neck:  Supple, no adenopathy, JVD, mass or bruit  Chest:  Clear to Ausculation, without wheezes, rales, rubs or ronchi  Cardiac: RRR  Extremities:  No cyanosis, clubbing or edema  Neurologic:  Ambulatory without assist, CN 2-12 grossly intact. Moves all extremities. Skin: no rash  Lymphadenopathy: no cervical or supraclavicular nodes    ASSESSMENT AND PLAN:     1. Encounter for immunization  - INFLUENZA, FLUARIX, FLULAVAL, FLUZONE (AGE 6 MO+), AFLURIA(AGE 3Y+) IM, PF, 0.5 ML    2. Anxiety and depression  Patient is back on Effexor. This has worked well for her in the past. She has contacted Yefri Álvarez to restart talk therapy. She will need some times for the medication to get into her system. Tentative return to work date 10/18/22.      RTC in 10 days    Micheal Morrison NP

## 2022-10-07 NOTE — LETTER
NOTIFICATION RETURN TO WORK / SCHOOL    10/7/2022 2:25 PM    Ms. Iliana Huber  [address] [phone]      To Whom It May Concern:    Iliana Huber is currently under the care of Dimas Smith. She has been out of work since 9/26/22    She will return to work/school on: 10/18/22    If there are questions or concerns please have the patient contact our office.         Sincerely,      Edith Dyer NP

## 2022-10-17 ENCOUNTER — OFFICE VISIT (OUTPATIENT)
Dept: FAMILY MEDICINE CLINIC | Age: 52
End: 2022-10-17

## 2022-10-17 ENCOUNTER — OFFICE VISIT (OUTPATIENT)
Dept: SURGERY | Age: 52
End: 2022-10-17

## 2022-10-17 VITALS
HEIGHT: 64 IN | DIASTOLIC BLOOD PRESSURE: 80 MMHG | TEMPERATURE: 98 F | SYSTOLIC BLOOD PRESSURE: 138 MMHG | HEART RATE: 77 BPM | WEIGHT: 120 LBS | BODY MASS INDEX: 20.49 KG/M2

## 2022-10-17 VITALS
SYSTOLIC BLOOD PRESSURE: 128 MMHG | TEMPERATURE: 98.7 F | OXYGEN SATURATION: 99 % | BODY MASS INDEX: 20.76 KG/M2 | WEIGHT: 121.6 LBS | HEART RATE: 71 BPM | DIASTOLIC BLOOD PRESSURE: 60 MMHG | RESPIRATION RATE: 20 BRPM | HEIGHT: 64 IN

## 2022-10-17 DIAGNOSIS — Z12.31 ENCOUNTER FOR SCREENING MAMMOGRAM FOR MALIGNANT NEOPLASM OF BREAST: ICD-10-CM

## 2022-10-17 DIAGNOSIS — Z12.11 COLON CANCER SCREENING: Primary | ICD-10-CM

## 2022-10-17 DIAGNOSIS — E11.9 TYPE 2 DIABETES MELLITUS WITHOUT COMPLICATION, WITHOUT LONG-TERM CURRENT USE OF INSULIN (HCC): ICD-10-CM

## 2022-10-17 DIAGNOSIS — F43.21 SITUATIONAL DEPRESSION: Primary | ICD-10-CM

## 2022-10-17 PROCEDURE — 3044F HG A1C LEVEL LT 7.0%: CPT | Performed by: NURSE PRACTITIONER

## 2022-10-17 PROCEDURE — 99213 OFFICE O/P EST LOW 20 MIN: CPT | Performed by: NURSE PRACTITIONER

## 2022-10-17 RX ORDER — METFORMIN HYDROCHLORIDE 1000 MG/1
TABLET ORAL
Qty: 90 TABLET | Refills: 4 | Status: SHIPPED | OUTPATIENT
Start: 2022-10-17

## 2022-10-17 NOTE — PATIENT INSTRUCTIONS
Learning About Colonoscopy  What is a colonoscopy? A colonoscopy is a test (also called a procedure) that lets a doctor look inside your large intestine. The doctor uses a thin, lighted tube called a colonoscope. The doctor uses it to look for small growths called polyps, colon or rectal cancer (colorectal cancer), or other problems like bleeding. During the procedure, the doctor can take samples of tissue. The samples can then be checked for cancer or other conditions. The doctor can also take out polyps. How is a colonoscopy done? This procedure is done in a doctor's office or a clinic or hospital. You will get medicine to help you relax and not feel pain. Some people find that they don't remember having the test because of the medicine. The doctor gently moves the colonoscope, or scope, through the colon. The scope is also a small video camera. It lets the doctor see the colon and take pictures. How do you prepare for the procedure? You need to clean out your colon before the procedure so the doctor can see your colon. This depends on which \"colon prep\" your doctor recommends. To clean out your colon, you'll do a \"colon prep\" before the test. This means you stop eating solid foods and drink only clear liquids. You can have water, tea, coffee, clear juices, clear broths, flavored ice pops, and gelatin (such as Jell-O). Do not drink anything red or purple. The day or night before the procedure, you drink a large amount of a special liquid. This causes loose, frequent stools. You will go to the bathroom a lot. Your doctor may have you drink part of the liquid the evening before and the rest on the day of the test. It's very important to drink all of the liquid. If you have problems drinking it, call your doctor. Arrange to have someone take you home after the test.  What can you expect after a colonoscopy? Your doctor will tell you when you can eat and do your usual activities.   Drink a lot of fluid after the test to replace the fluids you may have lost during the colon prep. But don't drink alcohol. Your doctor will talk to you about when you'll need your next colonoscopy. The results of your test and your risk for colorectal cancer will help your doctor decide how often you need to be checked. After the test, you may be bloated or have gas pains. You may need to pass gas. If a biopsy was done or a polyp was removed, you may have streaks of blood in your stool (feces) for a few days. Check with your doctor to see when it is safe to take aspirin and nonsteroidal anti-inflammatory drugs (NSAIDs) again. Problems such as heavy rectal bleeding may not occur until several weeks after the test. This isn't common. But it can happen after polyps are removed. Follow-up care is a key part of your treatment and safety. Be sure to make and go to all appointments, and call your doctor if you are having problems. It's also a good idea to know your test results and keep a list of the medicines you take. Where can you learn more? Go to http://www.gray.com/  Enter Z368 in the search box to learn more about \"Learning About Colonoscopy. \"  Current as of: September 8, 2021               Content Version: 13.2  © 2006-2022 Healthwise, Incorporated. Care instructions adapted under license by [employer] (which disclaims liability or warranty for this information). If you have questions about a medical condition or this instruction, always ask your healthcare professional. [place] [address] any warranty or liability for your use of this information.

## 2022-10-17 NOTE — PROGRESS NOTES
Minal Martin is a 50 y.o. female presenting for/with:    Chief Complaint   Patient presents with    Diabetes     Sugars averaging in the 120-130s     Anxiety     Feels better doing well on Venlafaxine        Visit Vitals  /60 (BP 1 Location: Left arm, BP Patient Position: Sitting)   Pulse 71   Temp 98.7 °F (37.1 °C) (Temporal)   Resp 20   Ht 5' 4\" (1.626 m)   Wt 121 lb 9.6 oz (55.2 kg)   SpO2 99%   BMI 20.87 kg/m²     Pain Scale: 0 - No pain/10  Pain Location:     1. \"Have you been to the ER, urgent care clinic since your last visit? Hospitalized since your last visit? \" No    2. \"Have you seen or consulted any other health care providers outside of the [address] [place] [address] since your last visit? \" No     3. For patients aged 39-70: Has the patient had a colonoscopy / FIT/ Cologuard? NA - based on age      If the patient is female:    4. For patients aged 41-77: Has the patient had a mammogram within the past 2 years? No      5. For patients aged 21-65: Has the patient had a pap smear? No          Patient    Learning Assessment 5/20/2022   PRIMARY LEARNER Patient   HIGHEST LEVEL OF EDUCATION - PRIMARY LEARNER  -   BARRIERS PRIMARY LEARNER -   CO-LEARNER CAREGIVER -   PRIMARY LANGUAGE ENGLISH   LEARNER PREFERENCE PRIMARY READING     -   ANSWERED BY pt   RELATIONSHIP SELF     Fall Risk Assessment, last 12 mths 7/5/2022   Able to walk? Yes   Fall in past 12 months? 0   Do you feel unsteady?  0   Are you worried about falling 0       3 most recent PHQ Screens 10/17/2022   PHQ Not Done -   Little interest or pleasure in doing things Not at all   Feeling down, depressed, irritable, or hopeless Not at all   Total Score PHQ 2 0   Trouble falling or staying asleep, or sleeping too much -   Feeling tired or having little energy -   Poor appetite, weight loss, or overeating -   Feeling bad about yourself - or that you are a failure or have let yourself or your family down -   Trouble concentrating on things such as school, work, reading, or watching TV -   Moving or speaking so slowly that other people could have noticed; or the opposite being so fidgety that others notice -   Thoughts of being better off dead, or hurting yourself in some way -   PHQ 9 Score -   How difficult have these problems made it for you to do your work, take care of your home and get along with others -     Abuse Screening Questionnaire 10/17/2022   Do you ever feel afraid of your partner? N   Are you in a relationship with someone who physically or mentally threatens you? N   Is it safe for you to go home?  Y       ADL Assessment 10/17/2022   Feeding yourself No Help Needed   Getting from bed to chair No Help Needed   Getting dressed No Help Needed   Bathing or showering No Help Needed   Walk across the room (includes cane/walker) No Help Needed   Using the telphone No Help Needed   Taking your medications No Help Needed   Preparing meals No Help Needed   Managing money (expenses/bills) No Help Needed   Moderately strenuous housework (laundry) No Help Needed   Shopping for personal items (toiletries/medicines) No Help Needed   Shopping for groceries No Help Needed   Driving No Help Needed   Climbing a flight of stairs No Help Needed   Getting to places beyond walking distances No Help Needed      Advance Care Planning 5/20/2022   Patient's Healthcare Decision Maker is: Legal Next of Kin   Confirm Advance Directive None   Patient Would Like to Complete Advance Directive No

## 2022-10-17 NOTE — PROGRESS NOTES
Chief Complaint   Patient presents with    Diabetes     Sugars averaging in the 120-130s     Anxiety     Feels better doing well on Venlafaxine          HPI:       is a 50 y.o. female. [employer] employee in [place]. Diabetes: Metformin 1,000 mg and Farxiga. No adverse effects. Last A1C 6.6%     Iron deficiency: Doing well on iron. Dose is spaced to 3 days per week. Anxiety: Her parents both passed. She lives in their family home. She has had feelings of panic that are worse when she is getting ready to go to work. Lien commutes 3 hours each way to work. She had a similar episode in 2019 when her mom first got sick. She adamantly denies SI. She did some talk therapy with Lupe Stovall LCSW. New Issues:  She has been out of work since 9/26/22. She has been started back on Effexor. She has a plan to get back in with DOROTEO Hughes, for talk therapy. Plan to return to work tomorrow (10/18/22). No Known Allergies    Current Outpatient Medications   Medication Sig    venlafaxine-SR (EFFEXOR-XR) 37.5 mg capsule Take 1 Capsule by mouth daily. Farxiga 5 mg tab tablet TAKE 1 TAB BY MOUTH DAILY. INDICATIONS: TYPE 2 DIABETES MELLITUS    glucose blood VI test strips (OneTouch Ultra Test) strip USE TO TEST GLUCOSE ONCE PER DAY. DX: E11.9. Blood-Glucose Meter monitoring kit Use to test glucose once per day. Dx: E11.9.    metFORMIN (GLUCOPHAGE) 1,000 mg tablet TAKE 1 TABLET BY MOUTH EVERY DAY WITH BREAKFAST    iron aspgly,xd-[id number]-Ca-suc (FERREX 150 FORTE PLUS) 501-29-88-1 mg-mg-mcg-mg cap cap Take 1 Cap by mouth daily. levonorgestreL (MIRENA) 20 mcg/24 hours (8 yrs) 52 mg IUD 1 Each by IntraUTERine route once. No current facility-administered medications for this visit. Past Medical History:   Diagnosis Date    Anemia     Diabetes ([address]        History reviewed. No pertinent surgical history.     Social History     Socioeconomic History    Marital status: SINGLE   Tobacco Use Smoking status: Never    Smokeless tobacco: Never   Vaping Use    Vaping Use: Never used   Substance and Sexual Activity    Alcohol use: Yes    Drug use: No    Sexual activity: Yes       Family History   Problem Relation Age of Onset    Diabetes Mother     Diabetes Father        Above history reviewed. ROS:  Denies fever, chills, cough, chest pain, SOB,  nausea, vomiting, or diarrhea. Denies wt loss, wt gain, hemoptysis, hematochezia or melena. Physical Examination:    /60 (BP 1 Location: Left arm, BP Patient Position: Sitting)   Pulse 71   Temp 98.7 °F (37.1 °C) (Temporal)   Resp 20   Ht 5' 4\" (1.626 m)   Wt 121 lb 9.6 oz (55.2 kg)   SpO2 99%   BMI 20.87 kg/m²     General: Alert and Ox3, Fluent speech  Neck:  Supple, no adenopathy, JVD, mass or bruit  Chest:  Clear to Ausculation, without wheezes, rales, rubs or ronchi  Cardiac: RRR  Extremities:  No cyanosis, clubbing or edema  Neurologic:  Ambulatory without assist, CN 2-12 grossly intact. Moves all extremities. Skin: no rash  Lymphadenopathy: no cervical or supraclavicular nodes    ASSESSMENT AND PLAN:     1. Situational depression  Improved  Patient is ready to attempt to return to work  Continue Effexor     2. Encounter for screening mammogram for malignant neoplasm of breast  - [facility] 3D BLANQU[facility] W MAMMO BI SCREENING INCL CAD; Future    3. Type 2 diabetes mellitus without complication, without long-term current use of insulin (HCC)  Good control  Continue current regimen of Metformin and Farxiga  - HEMOGLOBIN A1C WITH EAG; Future  - METABOLIC PANEL, BASIC; Future  - metFORMIN (GLUCOPHAGE) 1,000 mg tablet; TAKE 1 TABLET BY MOUTH EVERY DAY WITH BREAKFAST  Dispense: 90 Tablet;  Refill: 4  - METABOLIC PANEL, BASIC  - HEMOGLOBIN A1C WITH EAG     RTC in 3 months    Nathan Ashby NP

## 2022-10-17 NOTE — PROGRESS NOTES
Naz Merrill is a 50 y.o. female who presents today with the following:  No chief complaint on file. HPI    53-year-old female who presents as a referral from Micheal Morrison for possible screening colonoscopy. She has had no prior colon evaluation. She has no family history of colon cancer although she states her father had prostate cancer. She denies any melena or hematochezia or diarrhea or constipation. She has had no abdominal pain or unexpected weight loss per se however she believes in the last year she has lost 40 pounds because of depression. She thinks her weight is stabilized. She has not had any recent stool testing. She has had a . She denies any tobacco use. She rarely drinks alcohol. She does have a history of iron deficiency anemia that has been treated for years and it appears that her hemoglobin is in the acceptable range at 12.0 with a normal MCV of 89.1. She is also a diabetic with her last A1c being 6.6. She states that she has been vaccinated for COVID and has not contracted COVID. Past Medical History:   Diagnosis Date    Anemia     Diabetes ([facility]        No past surgical history on file. Social History     Socioeconomic History    Marital status: SINGLE     Spouse name: Not on file    Number of children: Not on file    Years of education: Not on file    Highest education level: Not on file   Occupational History    Not on file   Tobacco Use    Smoking status: Never    Smokeless tobacco: Never   Vaping Use    Vaping Use: Never used   Substance and Sexual Activity    Alcohol use:  Yes    Drug use: No    Sexual activity: Yes   Other Topics Concern    Not on file   Social History Narrative    Not on file     Social Determinants of Health     Financial Resource Strain: Not on file   Food Insecurity: Not on file   Transportation Needs: Not on file   Physical Activity: Not on file   Stress: Not on file   Social Connections: Not on file   Intimate Partner Violence: Not on file   Housing Stability: Not on file       Family History   Problem Relation Age of Onset    Diabetes Mother     Diabetes Father        No Known Allergies    Current Outpatient Medications   Medication Sig    venlafaxine-SR (EFFEXOR-XR) 37.5 mg capsule Take 1 Capsule by mouth daily.  Farxiga 5 mg tab tablet TAKE 1 TAB BY MOUTH DAILY. INDICATIONS: TYPE 2 DIABETES MELLITUS    glucose blood VI test strips (OneTouch Ultra Test) strip USE TO TEST GLUCOSE ONCE PER DAY. DX: E11.9.  Blood-Glucose Meter monitoring kit Use to test glucose once per day. Dx: E11.9.    metFORMIN (GLUCOPHAGE) 1,000 mg tablet TAKE 1 TABLET BY MOUTH EVERY DAY WITH BREAKFAST    iron aspgly,jl-[id number]-FA-Ca-suc (FERREX 150 FORTE PLUS) [id number] mg-mg-mcg-mg cap cap Take 1 Cap by mouth daily.  levonorgestreL (MIRENA) 20 mcg/24 hours (8 yrs) 52 mg IUD 1 Each by IntraUTERine route once. No current facility-administered medications for this visit. The above histories, medications and allergies have been reviewed. ROS    There were no vitals taken for this visit. Physical Exam  Constitutional:       Appearance: Normal appearance. Cardiovascular:      Rate and Rhythm: Normal rate and regular rhythm. Pulmonary:      Effort: No respiratory distress. Breath sounds: Normal breath sounds. No stridor. Abdominal:      General: There is no distension. Palpations: Abdomen is soft. There is no mass. Neurological:      Mental Status: She is alert. 1. Colon cancer screening  Recommend colonoscopy. The procedure was explained in detail including the risks and benefits. Risks shared included risks of missed lesions, incomplete exam, colon injury or perforation. Risks associated with anesthesia were also discussed. The patient wishes to proceed and we will schedule. We will request pediatric colonoscope based on patient's body habitus.     The patient was counseled at length about the risks of rodrigo Covid-19 during their perioperative period and any recovery window from their procedure. The patient was made aware that rodrigo Covid-19  may worsen their prognosis for recovering from their procedure and lend to a higher morbidity and/or mortality risk. All material risks, benefits, and reasonable alternatives including postponing the procedure were discussed. The patient does  wish to proceed with the procedure at this time. Follow-up and Dispositions    Return for post procedure.          Moshe Leahy MD

## 2022-10-17 NOTE — LETTER
10/17/2022    Patient: Bird De Los Santos   YOB: 1970   Date of Visit: 10/17/2022     Luann Plasencia NP  [address] [facility]  Via In Basket    Dear Luann Plasencia NP,      Thank you for referring Ms. Elvi Calderon to [address] [place] [address] [facility] for evaluation. My notes for this consultation are attached. If you have questions, please do not hesitate to call me. I look forward to following your patient along with you.       Sincerely,    Derek Rondon MD

## 2022-10-18 LAB
ANION GAP SERPL CALC-SCNC: 6 MMOL/L (ref 5–15)
BUN SERPL-MCNC: 6 MG/DL (ref 6–20)
BUN/CREAT SERPL: 8 (ref 12–20)
CALCIUM SERPL-MCNC: 8.5 MG/DL (ref 8.5–10.1)
CHLORIDE SERPL-SCNC: 106 MMOL/L (ref 97–108)
CO2 SERPL-SCNC: 29 MMOL/L (ref 21–32)
CREAT SERPL-MCNC: 0.79 MG/DL (ref 0.55–1.02)
EST. AVERAGE GLUCOSE BLD GHB EST-MCNC: 134 MG/DL
GLUCOSE SERPL-MCNC: 92 MG/DL (ref 65–100)
HBA1C MFR BLD: 6.3 % (ref 4–5.6)
POTASSIUM SERPL-SCNC: 3.6 MMOL/L (ref 3.5–5.1)
SODIUM SERPL-SCNC: 141 MMOL/L (ref 136–145)

## 2022-11-29 ENCOUNTER — OFFICE VISIT (OUTPATIENT)
Dept: FAMILY MEDICINE CLINIC | Age: 52
End: 2022-11-29
Payer: COMMERCIAL

## 2022-11-29 VITALS
HEART RATE: 80 BPM | BODY MASS INDEX: 20.69 KG/M2 | WEIGHT: 121.2 LBS | TEMPERATURE: 98.4 F | RESPIRATION RATE: 16 BRPM | SYSTOLIC BLOOD PRESSURE: 118 MMHG | DIASTOLIC BLOOD PRESSURE: 72 MMHG | OXYGEN SATURATION: 98 % | HEIGHT: 64 IN

## 2022-11-29 DIAGNOSIS — F43.21 SITUATIONAL DEPRESSION: ICD-10-CM

## 2022-11-29 DIAGNOSIS — H00.021 HORDEOLUM INTERNUM OF RIGHT UPPER EYELID: Primary | ICD-10-CM

## 2022-11-29 PROCEDURE — 99214 OFFICE O/P EST MOD 30 MIN: CPT | Performed by: NURSE PRACTITIONER

## 2022-11-29 RX ORDER — ERYTHROMYCIN 5 MG/G
OINTMENT OPHTHALMIC
Qty: 3.5 G | Refills: 0 | Status: SHIPPED | OUTPATIENT
Start: 2022-11-29

## 2022-11-29 NOTE — PROGRESS NOTES
Chief Complaint   Patient presents with    Eye Problem     R upper eyelid irritation . [place] using warm compresses w/o relief    Other     Needs to discuss some changes to [place] paperwork          HPI:       is a 50 y.o. female. [employer] employee in [place]. Diabetes: Metformin 1,000 mg and Farxiga. No adverse effects. Last A1C 6.3%     Iron deficiency: Doing well on iron. Dose is spaced to 3 days per week. Anxiety: Her parents both passed. She lives in their family home. She has had feelings of panic that are worse when she is getting ready to go to work. Lien commutes 3 hours each way to work. She had a similar episode in 2019 when her mom first got sick. She adamantly denies SI. She has been talking to the counselor at work. She has not gotten back in for talk therapy with Dorcus , LCSW. New Issues:  She did not go to work last week. Reports she did not sleep well due to stress and could not make herself go. She has not gotten in with her counselor. She has a sty that is not responding to warm compresses on the right. Painful and making her eye swell. No Known Allergies    Current Outpatient Medications   Medication Sig    metFORMIN (GLUCOPHAGE) 1,000 mg tablet TAKE 1 TABLET BY MOUTH EVERY DAY WITH BREAKFAST    venlafaxine-SR (EFFEXOR-XR) 37.5 mg capsule Take 1 Capsule by mouth daily. Farxiga 5 mg tab tablet TAKE 1 TAB BY MOUTH DAILY. INDICATIONS: TYPE 2 DIABETES MELLITUS    glucose blood VI test strips (OneTouch Ultra Test) strip USE TO TEST GLUCOSE ONCE PER DAY. DX: E11.9. Blood-Glucose Meter monitoring kit Use to test glucose once per day. Dx: E11.9.    iron aspgly,fh-[id number]-FA-Ca-suc (FERREX 150 FORTE PLUS) 725-72-19-6 mg-mg-mcg-mg cap cap Take 1 Cap by mouth daily. levonorgestreL (MIRENA) 20 mcg/24 hours (8 yrs) 52 mg IUD 1 Each by IntraUTERine route once. No current facility-administered medications for this visit.        Past Medical History:   Diagnosis Date Anemia     Diabetes ([facility] [address]        Past Surgical History:   Procedure Laterality Date    HX  SECTION         Social History     Socioeconomic History    Marital status: SINGLE   Tobacco Use    Smoking status: Never    Smokeless tobacco: Never   Vaping Use    Vaping Use: Never used   Substance and Sexual Activity    Alcohol use: Yes    Drug use: No    Sexual activity: Yes       Family History   Problem Relation Age of Onset    Diabetes Mother     Diabetes Father        Above history reviewed. ROS:  Denies fever, chills, cough, chest pain, SOB,  nausea, vomiting, or diarrhea. Denies wt loss, wt gain, hemoptysis, hematochezia or melena. Physical Examination:    /72 (BP 1 Location: Left arm, BP Patient Position: Sitting)   Pulse 80   Temp 98.4 °F (36.9 °C) (Temporal)   Resp 16   Ht 5' 4\" (1.626 m)   Wt 121 lb 3.2 oz (55 kg)   SpO2 98%   BMI 20.80 kg/m²     General: Alert and Ox3, Fluent speech, thin  HEENT:  PERRLA, EOM intact, right upper eyelid with 1.5 cm internal hordeolum with surrounding erythema, TMs, turbinates, pharynx normal.  No thyromegaly. No cervical adenopathy. Neck:  Supple, no adenopathy, JVD, mass or bruit  Chest:  Clear to Ausculation, without wheezes, rales, rubs or ronchi  Cardiac: RRR  Extremities:  No cyanosis, clubbing or edema  Neurologic:  Ambulatory without assist, CN 2-12 grossly intact. Moves all extremities. Skin: no rash  Lymphadenopathy: no cervical or supraclavicular nodes    ASSESSMENT AND PLAN:     1. Hordeolum internum of right upper eyelid  Continue warm compresses  Add erythromycin ointment   If not improved, will need to see eye specialist   - erythromycin (ILOTYCIN) ophthalmic ointment; Use 1 cm to the inside of the right eyelid 4 times per day for 1 week  Dispense: 3.5 g; Refill: 0    2.  Situational depression  Discussed with patient that she has not checked in with her counselor, Elysia Montes, as recommended and has not looked for a job without a commute to improve her mental health. Continuing to cover her for staying home is only enabling the behavior and not helping her to make the life changes she needs. Again, recommend she begins to look for a job closer to home to work on work/life balance since she dreads the drive and calls out due to this.       RTC PRN    Ella Redding NP

## 2022-11-29 NOTE — PROGRESS NOTES
Mike Cowan is a 50 y.o. female presenting for/with:    Chief Complaint   Patient presents with    Eye Problem     R upper eyelid irritation . Graham Malone using warm compresses w/o relief    Other     Needs to discuss some changes to FMLA paperwork        Visit Vitals  /72 (BP 1 Location: Left arm, BP Patient Position: Sitting)   Pulse 80   Temp 98.4 °F (36.9 °C) (Temporal)   Resp 16   Ht 5' 4\" (1.626 m)   Wt 121 lb 3.2 oz (55 kg)   SpO2 98%   BMI 20.80 kg/m²     Pain Scale: 0 - No pain/10  Pain Location:     1. \"Have you been to the ER, urgent care clinic since your last visit? Hospitalized since your last visit? \" No    2. \"Have you seen or consulted any other health care providers outside of the [address] [place] [address] since your last visit? \" No     3. For patients aged 39-70: Has the patient had a colonoscopy / FIT/ Cologuard? No      If the patient is female:    4. For patients aged 41-77: Has the patient had a mammogram within the past 2 years? Yes - Care Gap present. Most recent result on file      5. For patients aged 21-65: Has the patient had a pap smear? No          Patient    Learning Assessment 5/20/2022   PRIMARY LEARNER Patient   HIGHEST LEVEL OF EDUCATION - PRIMARY LEARNER  -   BARRIERS PRIMARY LEARNER -   CO-LEARNER CAREGIVER -   PRIMARY LANGUAGE ENGLISH   LEARNER PREFERENCE PRIMARY READING     -   ANSWERED BY pt   RELATIONSHIP SELF     Fall Risk Assessment, last 12 mths 7/5/2022   Able to walk? Yes   Fall in past 12 months? 0   Do you feel unsteady?  0   Are you worried about falling 0       3 most recent PHQ Screens 11/29/2022   PHQ Not Done -   Little interest or pleasure in doing things Not at all   Feeling down, depressed, irritable, or hopeless Not at all   Total Score PHQ 2 0   Trouble falling or staying asleep, or sleeping too much -   Feeling tired or having little energy -   Poor appetite, weight loss, or overeating -   Feeling bad about yourself - or that you are a failure or have let yourself or your family down -   Trouble concentrating on things such as school, work, reading, or watching TV -   Moving or speaking so slowly that other people could have noticed; or the opposite being so fidgety that others notice -   Thoughts of being better off dead, or hurting yourself in some way -   PHQ 9 Score -   How difficult have these problems made it for you to do your work, take care of your home and get along with others -     Abuse Screening Questionnaire 10/17/2022   Do you ever feel afraid of your partner? N   Are you in a relationship with someone who physically or mentally threatens you? N   Is it safe for you to go home?  Y       ADL Assessment 10/17/2022   Feeding yourself No Help Needed   Getting from bed to chair No Help Needed   Getting dressed No Help Needed   Bathing or showering No Help Needed   Walk across the room (includes cane/walker) No Help Needed   Using the telphone No Help Needed   Taking your medications No Help Needed   Preparing meals No Help Needed   Managing money (expenses/bills) No Help Needed   Moderately strenuous housework (laundry) No Help Needed   Shopping for personal items (toiletries/medicines) No Help Needed   Shopping for groceries No Help Needed   Driving No Help Needed   Climbing a flight of stairs No Help Needed   Getting to places beyond walking distances No Help Needed      Advance Care Planning 11/21/2022   Patient's Healthcare Decision Maker is: -   Confirm Advance Directive None   Patient Would Like to Complete Advance Directive No

## 2023-10-19 NOTE — PROGRESS NOTES
Identified pt with two pt identifiers(name and ). Reviewed record in preparation for visit and have obtained necessary documentation. All patient medications has been reviewed. Chief Complaint   Patient presents with    New Patient    Colon Cancer Screening       Health Maintenance Due   Topic    Hepatitis B Vaccine (1 of 3 - Risk 3-dose series)    Colorectal Cancer Screening Combo     Eye Exam Retinal or Dilated     Breast Cancer Screen Mammogram     Cervical cancer screen        There were no vitals filed for this visit. 4.Have you been to the ER, urgent care clinic since your last visit? Hospitalized since your last visit? No    5. Have you seen or consulted any other health care providers outside of the [address] [place] [address] since your last visit? Include any pap smears or colon screening. No      Patient is accompanied by self I have received verbal consent from Sacha Murdock to discuss any/all medical information while they are present in the room. Monitor asymptomatic currently as needed steroids